# Patient Record
Sex: MALE | Race: WHITE | NOT HISPANIC OR LATINO
[De-identification: names, ages, dates, MRNs, and addresses within clinical notes are randomized per-mention and may not be internally consistent; named-entity substitution may affect disease eponyms.]

---

## 2017-01-03 ENCOUNTER — RECORD ABSTRACTING (OUTPATIENT)
Age: 57
End: 2017-01-03

## 2017-01-03 DIAGNOSIS — G47.00 INSOMNIA, UNSPECIFIED: ICD-10-CM

## 2017-01-03 DIAGNOSIS — Z87.09 PERSONAL HISTORY OF OTHER DISEASES OF THE RESPIRATORY SYSTEM: ICD-10-CM

## 2017-01-03 DIAGNOSIS — R63.4 ABNORMAL WEIGHT LOSS: ICD-10-CM

## 2017-01-10 ENCOUNTER — APPOINTMENT (OUTPATIENT)
Dept: INTERNAL MEDICINE | Facility: HOSPITAL | Age: 57
End: 2017-01-10

## 2017-01-10 ENCOUNTER — OUTPATIENT (OUTPATIENT)
Dept: OUTPATIENT SERVICES | Facility: HOSPITAL | Age: 57
LOS: 1 days | Discharge: HOME | End: 2017-01-10

## 2017-01-10 VITALS
HEART RATE: 88 BPM | DIASTOLIC BLOOD PRESSURE: 68 MMHG | BODY MASS INDEX: 22.82 KG/M2 | TEMPERATURE: 98 F | WEIGHT: 142 LBS | HEIGHT: 66 IN | SYSTOLIC BLOOD PRESSURE: 120 MMHG

## 2017-01-10 LAB
CREAT SERPL-MCNC: 0.6
GLUCOSE SERPL-MCNC: 184
HBA1C MFR BLD: 7.8

## 2017-01-10 RX ORDER — ALBUTEROL SULFATE 108 UG/1
AEROSOL, METERED RESPIRATORY (INHALATION)
Refills: 0 | Status: DISCONTINUED | COMMUNITY
End: 2017-01-10

## 2017-01-20 LAB
ALBUMIN SERPL-MCNC: 4.2 G/DL
ALBUMIN/GLOB SERPL: 1.75
ALP SERPL-CCNC: 74 IU/L
ALT SERPL-CCNC: 39 IU/L
ANION GAP SERPL CALC-SCNC: 7 MMOL/L
AST SERPL-CCNC: 22 IU/L
BASOPHILS # BLD: 0.06 TH/MM3
BASOPHILS NFR BLD: 1.2 %
BILIRUB SERPL-MCNC: 0.5 MG/DL
BUN SERPL-MCNC: 13 MG/DL
BUN/CREAT SERPL: 19.7 %
CALCIUM SERPL-MCNC: 9.4 MG/DL
CHLORIDE SERPL-SCNC: 101 MMOL/L
CHOLEST SERPL-MCNC: 136 MG/DL
CO2 SERPL-SCNC: 30 MMOL/L
CREAT SERPL-MCNC: 0.66 MG/DL
EOSINOPHIL # BLD: 0.36 TH/MM3
EOSINOPHIL NFR BLD: 7.1 %
ERYTHROCYTE [DISTWIDTH] IN BLOOD BY AUTOMATED COUNT: 12 %
GFR SERPL CREATININE-BSD FRML MDRD: 125
GLUCOSE SERPL-MCNC: 176 MG/DL
GRANULOCYTES # BLD: 2.52 TH/MM3
GRANULOCYTES NFR BLD: 49.8 %
HCT VFR BLD AUTO: 44.4 %
HDLC SERPL-MCNC: 39 MG/DL
HDLC SERPL: 3.5
HGB BLD-MCNC: 14.9 G/DL
IMM GRANULOCYTES # BLD: 0.01 TH/MM3
IMM GRANULOCYTES NFR BLD: 0.2 %
LDLC SERPL DIRECT ASSAY-MCNC: 72 MG/DL
LYMPHOCYTES # BLD: 1.42 TH/MM3
LYMPHOCYTES NFR BLD: 28.1 %
MCH RBC QN AUTO: 30 PG
MCHC RBC AUTO-ENTMCNC: 33.6 G/DL
MCV RBC AUTO: 89.3 FL
MONOCYTES # BLD: 0.69 TH/MM3
MONOCYTES NFR BLD: 13.6 %
PLATELET # BLD: 254 TH/MM3
PMV BLD AUTO: 10.6 FL
POTASSIUM SERPL-SCNC: 4.5 MMOL/L
PROT SERPL-MCNC: 6.6 G/DL
RBC # BLD AUTO: 4.97 ML/MM3
SODIUM SERPL-SCNC: 138 MMOL/L
TRIGL SERPL-MCNC: 63 MG/DL
VLDLC SERPL-MCNC: 12 MG/DL
WBC # BLD: 5.06 TH/MM3

## 2017-01-23 LAB
CREAT UR-MCNC: 111 MG/DL
ESTIMATED AVERAGE GLUCOSE (SOUTH): 180 MG/DL
HBA1C MFR BLD: 7.9 %
MICROALBUMIN UR-MCNC: 2.2 MG/DL
MICROALBUMIN/CREAT UR-RTO: 20 UG/MG

## 2017-01-24 LAB
25(OH)D3 SERPL-MCNC: 36 NG/ML
VITAMIN D2 SERPL-MCNC: <4 NG/ML
VITAMIN D3 SERPL-MCNC: 36 NG/ML

## 2017-01-26 ENCOUNTER — APPOINTMENT (OUTPATIENT)
Age: 57
End: 2017-01-26

## 2017-01-27 DIAGNOSIS — Z02.82 ENCOUNTER FOR ADOPTION SERVICES: ICD-10-CM

## 2017-02-27 PROBLEM — Z02.82 ADOPTED: Status: ACTIVE | Noted: 2017-02-27

## 2017-06-27 DIAGNOSIS — E11.9 TYPE 2 DIABETES MELLITUS WITHOUT COMPLICATIONS: ICD-10-CM

## 2017-06-27 DIAGNOSIS — Z23 ENCOUNTER FOR IMMUNIZATION: ICD-10-CM

## 2017-06-27 DIAGNOSIS — E78.00 PURE HYPERCHOLESTEROLEMIA, UNSPECIFIED: ICD-10-CM

## 2017-06-27 DIAGNOSIS — E55.9 VITAMIN D DEFICIENCY, UNSPECIFIED: ICD-10-CM

## 2018-05-01 ENCOUNTER — OUTPATIENT (OUTPATIENT)
Dept: OUTPATIENT SERVICES | Facility: HOSPITAL | Age: 58
LOS: 1 days | Discharge: HOME | End: 2018-05-01

## 2018-05-01 DIAGNOSIS — Z00.8 ENCOUNTER FOR OTHER GENERAL EXAMINATION: ICD-10-CM

## 2018-05-01 DIAGNOSIS — I49.9 CARDIAC ARRHYTHMIA, UNSPECIFIED: ICD-10-CM

## 2018-05-01 LAB
ALBUMIN SERPL ELPH-MCNC: 4.4 G/DL — SIGNIFICANT CHANGE UP (ref 3.5–5.2)
ALP SERPL-CCNC: 107 U/L — SIGNIFICANT CHANGE UP (ref 30–115)
ALT FLD-CCNC: 21 U/L — SIGNIFICANT CHANGE UP (ref 0–41)
ANION GAP SERPL CALC-SCNC: 12 MMOL/L — SIGNIFICANT CHANGE UP (ref 7–14)
APPEARANCE UR: CLEAR — SIGNIFICANT CHANGE UP
AST SERPL-CCNC: 15 U/L — SIGNIFICANT CHANGE UP (ref 0–41)
BILIRUB DIRECT SERPL-MCNC: <0.2 MG/DL — SIGNIFICANT CHANGE UP (ref 0–0.2)
BILIRUB INDIRECT FLD-MCNC: 0 MG/DL — SIGNIFICANT CHANGE UP (ref 0.2–1.2)
BILIRUB SERPL-MCNC: <0.2 MG/DL — SIGNIFICANT CHANGE UP (ref 0.2–1.2)
BILIRUB UR-MCNC: NEGATIVE — SIGNIFICANT CHANGE UP
BUN SERPL-MCNC: 12 MG/DL — SIGNIFICANT CHANGE UP (ref 10–20)
CALCIUM SERPL-MCNC: 9.9 MG/DL — SIGNIFICANT CHANGE UP (ref 8.5–10.1)
CHLORIDE SERPL-SCNC: 100 MMOL/L — SIGNIFICANT CHANGE UP (ref 98–110)
CHOLEST SERPL-MCNC: 214 MG/DL — HIGH (ref 100–200)
CO2 SERPL-SCNC: 28 MMOL/L — SIGNIFICANT CHANGE UP (ref 17–32)
COLOR SPEC: YELLOW — SIGNIFICANT CHANGE UP
CREAT SERPL-MCNC: 0.7 MG/DL — SIGNIFICANT CHANGE UP (ref 0.7–1.5)
DIFF PNL FLD: NEGATIVE — SIGNIFICANT CHANGE UP
ESTIMATED AVERAGE GLUCOSE: 160 MG/DL — HIGH (ref 68–114)
GLUCOSE SERPL-MCNC: 297 MG/DL — HIGH (ref 70–99)
GLUCOSE UR QL: >=1000 MG/DL
HBA1C BLD-MCNC: 7.2 % — HIGH (ref 4–5.6)
HCT VFR BLD CALC: 41.5 % — LOW (ref 42–52)
HDLC SERPL-MCNC: 51 MG/DL — SIGNIFICANT CHANGE UP (ref 40–125)
HGB BLD-MCNC: 13.7 G/DL — LOW (ref 14–18)
KETONES UR-MCNC: NEGATIVE — SIGNIFICANT CHANGE UP
LEUKOCYTE ESTERASE UR-ACNC: NEGATIVE — SIGNIFICANT CHANGE UP
LIPID PNL WITH DIRECT LDL SERPL: 140 MG/DL — HIGH (ref 4–129)
MAGNESIUM SERPL-MCNC: 2 MG/DL — SIGNIFICANT CHANGE UP (ref 1.8–2.4)
MCHC RBC-ENTMCNC: 30 PG — SIGNIFICANT CHANGE UP (ref 27–31)
MCHC RBC-ENTMCNC: 33 G/DL — SIGNIFICANT CHANGE UP (ref 32–37)
MCV RBC AUTO: 91 FL — SIGNIFICANT CHANGE UP (ref 80–94)
NITRITE UR-MCNC: NEGATIVE — SIGNIFICANT CHANGE UP
NRBC # BLD: 0 /100 WBCS — SIGNIFICANT CHANGE UP (ref 0–0)
PH UR: 6 — SIGNIFICANT CHANGE UP (ref 5–8)
PLATELET # BLD AUTO: 270 K/UL — SIGNIFICANT CHANGE UP (ref 130–400)
POTASSIUM SERPL-MCNC: 4.9 MMOL/L — SIGNIFICANT CHANGE UP (ref 3.5–5)
POTASSIUM SERPL-SCNC: 4.9 MMOL/L — SIGNIFICANT CHANGE UP (ref 3.5–5)
PROT SERPL-MCNC: 6.8 G/DL — SIGNIFICANT CHANGE UP (ref 6–8)
PROT UR-MCNC: NEGATIVE MG/DL — SIGNIFICANT CHANGE UP
RBC # BLD: 4.56 M/UL — LOW (ref 4.7–6.1)
RBC # FLD: 12.4 % — SIGNIFICANT CHANGE UP (ref 11.5–14.5)
SODIUM SERPL-SCNC: 140 MMOL/L — SIGNIFICANT CHANGE UP (ref 135–146)
SP GR SPEC: 1.01 — SIGNIFICANT CHANGE UP (ref 1.01–1.03)
TOTAL CHOLESTEROL/HDL RATIO MEASUREMENT: 4.2 RATIO — SIGNIFICANT CHANGE UP (ref 4–5.5)
TRIGL SERPL-MCNC: 173 MG/DL — HIGH (ref 10–149)
UROBILINOGEN FLD QL: 0.2 MG/DL — SIGNIFICANT CHANGE UP (ref 0.2–0.2)
WBC # BLD: 5.6 K/UL — SIGNIFICANT CHANGE UP (ref 4.8–10.8)
WBC # FLD AUTO: 5.6 K/UL — SIGNIFICANT CHANGE UP (ref 4.8–10.8)

## 2018-05-02 LAB
HAV IGM SER-ACNC: SIGNIFICANT CHANGE UP
HBV CORE IGM SER-ACNC: SIGNIFICANT CHANGE UP
HBV SURFACE AG SER-ACNC: SIGNIFICANT CHANGE UP
HCV AB S/CO SERPL IA: 0.21 S/CO — SIGNIFICANT CHANGE UP
HCV AB SERPL-IMP: SIGNIFICANT CHANGE UP
T PALLIDUM AB TITR SER: NEGATIVE — SIGNIFICANT CHANGE UP

## 2018-06-07 ENCOUNTER — APPOINTMENT (OUTPATIENT)
Dept: INTERNAL MEDICINE | Facility: HOSPITAL | Age: 58
End: 2018-06-07

## 2018-06-08 ENCOUNTER — EMERGENCY (EMERGENCY)
Facility: HOSPITAL | Age: 58
LOS: 0 days | Discharge: HOME | End: 2018-06-08
Attending: EMERGENCY MEDICINE | Admitting: EMERGENCY MEDICINE

## 2018-06-08 VITALS
TEMPERATURE: 99 F | DIASTOLIC BLOOD PRESSURE: 74 MMHG | HEIGHT: 68 IN | SYSTOLIC BLOOD PRESSURE: 145 MMHG | RESPIRATION RATE: 18 BRPM | WEIGHT: 149.91 LBS | HEART RATE: 72 BPM | OXYGEN SATURATION: 97 %

## 2018-06-08 DIAGNOSIS — Z79.899 OTHER LONG TERM (CURRENT) DRUG THERAPY: ICD-10-CM

## 2018-06-08 DIAGNOSIS — Z76.0 ENCOUNTER FOR ISSUE OF REPEAT PRESCRIPTION: ICD-10-CM

## 2018-06-08 DIAGNOSIS — E11.9 TYPE 2 DIABETES MELLITUS WITHOUT COMPLICATIONS: ICD-10-CM

## 2018-06-08 DIAGNOSIS — Z79.84 LONG TERM (CURRENT) USE OF ORAL HYPOGLYCEMIC DRUGS: ICD-10-CM

## 2018-06-08 DIAGNOSIS — Z79.52 LONG TERM (CURRENT) USE OF SYSTEMIC STEROIDS: ICD-10-CM

## 2018-06-08 DIAGNOSIS — Z79.82 LONG TERM (CURRENT) USE OF ASPIRIN: ICD-10-CM

## 2018-06-08 DIAGNOSIS — F17.200 NICOTINE DEPENDENCE, UNSPECIFIED, UNCOMPLICATED: ICD-10-CM

## 2018-06-08 DIAGNOSIS — Z79.891 LONG TERM (CURRENT) USE OF OPIATE ANALGESIC: ICD-10-CM

## 2018-06-08 DIAGNOSIS — F32.9 MAJOR DEPRESSIVE DISORDER, SINGLE EPISODE, UNSPECIFIED: ICD-10-CM

## 2018-06-08 DIAGNOSIS — G62.9 POLYNEUROPATHY, UNSPECIFIED: ICD-10-CM

## 2018-06-08 DIAGNOSIS — I10 ESSENTIAL (PRIMARY) HYPERTENSION: ICD-10-CM

## 2018-06-08 RX ORDER — METFORMIN HYDROCHLORIDE 850 MG/1
1 TABLET ORAL
Qty: 28 | Refills: 0 | OUTPATIENT
Start: 2018-06-08 | End: 2018-06-21

## 2018-06-08 RX ORDER — GABAPENTIN 400 MG/1
1 CAPSULE ORAL
Qty: 36 | Refills: 0 | OUTPATIENT
Start: 2018-06-08 | End: 2018-06-19

## 2018-06-08 RX ORDER — ASPIRIN/CALCIUM CARB/MAGNESIUM 324 MG
1 TABLET ORAL
Qty: 14 | Refills: 0 | OUTPATIENT
Start: 2018-06-08 | End: 2018-06-21

## 2018-06-08 RX ORDER — SERTRALINE 25 MG/1
1 TABLET, FILM COATED ORAL
Qty: 28 | Refills: 0 | OUTPATIENT
Start: 2018-06-08 | End: 2018-06-21

## 2018-06-08 RX ORDER — GABAPENTIN 400 MG/1
1 CAPSULE ORAL
Qty: 42 | Refills: 0 | OUTPATIENT
Start: 2018-06-08 | End: 2018-06-21

## 2018-06-08 RX ORDER — GABAPENTIN 400 MG/1
400 CAPSULE ORAL ONCE
Qty: 0 | Refills: 0 | Status: COMPLETED | OUTPATIENT
Start: 2018-06-08 | End: 2018-06-08

## 2018-06-08 RX ADMIN — GABAPENTIN 400 MILLIGRAM(S): 400 CAPSULE ORAL at 09:58

## 2018-06-08 NOTE — ED PROVIDER NOTE - PROGRESS NOTE DETAILS
Spoke with Dr. Lehman, will see pt this week, advised pt to call monday for earlier apt. Pt has no symptoms here. Plan for DC.

## 2018-06-08 NOTE — ED PROVIDER NOTE - NS ED ROS FT
Eyes:  No visual changes  ENMT:  No neck pain or stiffness.  Cardiac:  No chest pain, SOB   Respiratory:  No cough   GI:  No nausea, vomiting, diarrhea or abdominal pain.  :  No dysuria, frequency or burning.  MS:  No back pain.  Neuro:  No headache or weakness.  No LOC.  Skin:  No skin rash.   Endocrine: No history of thyroid disease or diabetes.

## 2018-06-08 NOTE — ED PROVIDER NOTE - OBJECTIVE STATEMENT
59 yo M recently released from [facility]. Seen by [facility] physician who wrote him for his meds, but now out of meds. Has appointment with Dr. Lehman in 2 weeks, but needs med refill till then. Pt has pill bottles with him here with dosing schedule, will plan to refill. Pt here asymptomatic.

## 2018-06-08 NOTE — ED PROVIDER NOTE - PHYSICAL EXAMINATION
CONSTITUTIONAL: Well-developed; well-nourished; in no acute distress.   SKIN: warm, dry  HEAD: Normocephalic; atraumatic.  EYES: no conj injection  ENT: No nasal discharge; airway clear.  NECK: Supple; non tender.  CARD: S1, S2 normal; no murmurs, gallops, or rubs. Regular rate and rhythm.   RESP: No wheezes, rales or rhonchi.  ABD: soft ntnd  EXT: Normal ROM.  NEURO: Alert, oriented, grossly unremarkable  PSYCH: Cooperative, appropriate.

## 2018-06-19 ENCOUNTER — OUTPATIENT (OUTPATIENT)
Dept: OUTPATIENT SERVICES | Facility: HOSPITAL | Age: 58
LOS: 1 days | Discharge: HOME | End: 2018-06-19

## 2018-06-19 ENCOUNTER — APPOINTMENT (OUTPATIENT)
Dept: INTERNAL MEDICINE | Facility: HOSPITAL | Age: 58
End: 2018-06-19

## 2018-06-19 VITALS
DIASTOLIC BLOOD PRESSURE: 78 MMHG | BODY MASS INDEX: 23.95 KG/M2 | HEART RATE: 64 BPM | TEMPERATURE: 98.7 F | HEIGHT: 66 IN | WEIGHT: 149 LBS | SYSTOLIC BLOOD PRESSURE: 132 MMHG

## 2018-06-19 DIAGNOSIS — Z87.898 PERSONAL HISTORY OF OTHER SPECIFIED CONDITIONS: ICD-10-CM

## 2018-06-19 NOTE — REVIEW OF SYSTEMS
[Depression] : depression [Fever] : no fever [Chills] : no chills [Fatigue] : no fatigue [Hot Flashes] : no hot flashes [Night Sweats] : no night sweats [Recent Change In Weight] : ~T no recent weight change [Discharge] : no discharge [Pain] : no pain [Redness] : no redness [Dryness] : no dryness [Vision Problems] : no vision problems [Itching] : no itching [Earache] : no earache [Hearing Loss] : no hearing loss [Nosebleeds] : no nosebleeds [Postnasal Drip] : no postnasal drip [Nasal Discharge] : no nasal discharge [Sore Throat] : no sore throat [Hoarseness] : no hoarseness [Chest Pain] : no chest pain [Palpitations] : no palpitations [Claudication] : no  leg claudication [Lower Ext Edema] : no lower extremity edema [Orthopena] : no orthopnea [Paroysmal Nocturnal Dyspnea] : no paroysmal nocturnal dyspnea [Shortness Of Breath] : no shortness of breath [Wheezing] : no wheezing [Cough] : no cough [Dyspnea on Exertion] : not dyspnea on exertion [Abdominal Pain] : no abdominal pain [Nausea] : no nausea [Constipation] : no constipation [Diarrhea] : no diarrhea [Vomiting] : no vomiting [Heartburn] : no heartburn [Melena] : no melena [Dysuria] : no dysuria [Incontinence] : no incontinence [Hesitancy] : no hesitancy [Nocturia] : no nocturia [Hematuria] : no hematuria [Frequency] : no frequency [Joint Pain] : no joint pain [Joint Stiffness] : no joint stiffness [Muscle Pain] : no muscle pain [Muscle Weakness] : no muscle weakness [Back Pain] : no back pain [Joint Swelling] : no joint swelling [Mole Changes] : no mole changes [Nail Changes] : no nail changes [Hair Changes] : no hair changes [Skin Rash] : no skin rash [Headache] : no headache [Dizziness] : no dizziness [Fainting] : no fainting [Confusion] : no confusion [Unsteady Walk] : no ataxia [Memory Loss] : no memory loss [Suicidal] : not suicidal [Insomnia] : no insomnia [Anxiety] : no anxiety [Easy Bleeding] : no easy bleeding [Easy Bruising] : no easy bruising [Swollen Glands] : no swollen glands

## 2018-06-19 NOTE — COUNSELING
[Smoking cessation counseling provided] : smoking cessation [de-identified] : 2000 calories low sodium, low fat/chol. high fiber ADA diet

## 2018-06-19 NOTE — HISTORY OF PRESENT ILLNESS
[FreeTextEntry1] : Pt. is a 57yo male, has not been seen since 1/17.  Pt. states he was incarcerated, and just release in 4/18.  Pt. was under treatment while incarcerated.  Pt. denies any chest pain, no shortness of breath, no palpitation, no n/v/diarrhea, no muscle/joint ache/pain/weakness, no polyuria/polydipsia/polyphagia.  Pt. has diabetic neuropathy on gabapentin.  Pt. reports his medication was change while in skilled nursing, was put on different medication and different strength.

## 2018-06-19 NOTE — ASSESSMENT
[FreeTextEntry1] : 01.  NIDDM/diabetic neuropathy: on metformin, lantus, enalapril, ASA, and gabapentin\par a.  continue current med.\par b.  2000 calories low sodium, low fat/chol. high fiber ADA diet\par c.  optometry and podiatry referral\par d.  fasting CMP, CBC, lipid profile, HgA1C, vitamin d\par e.  urine microalbumin\par f.  follow up visit in 3 months\par 02.  Hypercholesterolemia: pt. was not prescribed statin while incarcerated, will restart pravastatin\par a.  pravastatin 80mg at bedtime\par 03.  h/o Vitamin d deficiency: on vitamin d supplement\par 04.  HCM\par a.  pt. scored PHQ-9 14, pt. will be referred to mental health.  Pt. denies any suicidal/homicidal ideation\par b.  EKG ordered\par c.  GI referral for colorectal cancer screening\par d.  smoking cessation counseling done, pt. no interested to quit yet\par \par

## 2018-06-19 NOTE — HEALTH RISK ASSESSMENT
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

## 2018-06-20 DIAGNOSIS — F17.200 NICOTINE DEPENDENCE, UNSPECIFIED, UNCOMPLICATED: ICD-10-CM

## 2018-06-20 DIAGNOSIS — E11.40 TYPE 2 DIABETES MELLITUS WITH DIABETIC NEUROPATHY, UNSPECIFIED: ICD-10-CM

## 2018-06-20 DIAGNOSIS — Z86.39 PERSONAL HISTORY OF OTHER ENDOCRINE, NUTRITIONAL AND METABOLIC DISEASE: ICD-10-CM

## 2018-06-20 DIAGNOSIS — E78.00 PURE HYPERCHOLESTEROLEMIA, UNSPECIFIED: ICD-10-CM

## 2018-06-20 DIAGNOSIS — E11.29 TYPE 2 DIABETES MELLITUS WITH OTHER DIABETIC KIDNEY COMPLICATION: ICD-10-CM

## 2018-06-20 DIAGNOSIS — R80.9 PROTEINURIA, UNSPECIFIED: ICD-10-CM

## 2018-06-20 LAB
ESTIMATED AVERAGE GLUCOSE: 177 MG/DL
HBA1C MFR BLD HPLC: 7.8 %

## 2018-06-21 LAB
ALBUMIN SERPL ELPH-MCNC: 4.7 G/DL
ALP BLD-CCNC: 95 U/L
ALT SERPL-CCNC: 33 U/L
ANION GAP SERPL CALC-SCNC: 15 MMOL/L
AST SERPL-CCNC: 22 U/L
BASOPHILS # BLD AUTO: 0.09 K/UL
BASOPHILS NFR BLD AUTO: 1.6 %
BILIRUB SERPL-MCNC: <0.2 MG/DL
BUN SERPL-MCNC: 13 MG/DL
CALCIUM SERPL-MCNC: 9.6 MG/DL
CHLORIDE SERPL-SCNC: 97 MMOL/L
CHOLEST SERPL-MCNC: 165 MG/DL
CHOLEST/HDLC SERPL: 3.8 RATIO
CO2 SERPL-SCNC: 28 MMOL/L
CREAT SERPL-MCNC: 0.8 MG/DL
CREAT SPEC-SCNC: 131 MG/DL
EOSINOPHIL # BLD AUTO: 0.29 K/UL
EOSINOPHIL NFR BLD AUTO: 5.3 %
GLUCOSE SERPL-MCNC: 224 MG/DL
HCT VFR BLD CALC: 43.5 %
HDLC SERPL-MCNC: 43 MG/DL
HGB BLD-MCNC: 14.4 G/DL
IMM GRANULOCYTES NFR BLD AUTO: 0.2 %
LDLC SERPL CALC-MCNC: 72 MG/DL
LYMPHOCYTES # BLD AUTO: 1.42 K/UL
LYMPHOCYTES NFR BLD AUTO: 26 %
MAN DIFF?: NORMAL
MCHC RBC-ENTMCNC: 29.4 PG
MCHC RBC-ENTMCNC: 33.1 G/DL
MCV RBC AUTO: 89 FL
MICROALBUMIN 24H UR DL<=1MG/L-MCNC: 1.4 MG/DL
MICROALBUMIN/CREAT 24H UR-RTO: 11 MG/G
MONOCYTES # BLD AUTO: 0.76 K/UL
MONOCYTES NFR BLD AUTO: 13.9 %
NEUTROPHILS # BLD AUTO: 2.89 K/UL
NEUTROPHILS NFR BLD AUTO: 53 %
PLATELET # BLD AUTO: 239 K/UL
POTASSIUM SERPL-SCNC: 5 MMOL/L
PROT SERPL-MCNC: 7.1 G/DL
RBC # BLD: 4.89 M/UL
RBC # FLD: 12.1 %
SODIUM SERPL-SCNC: 140 MMOL/L
TRIGL SERPL-MCNC: 252 MG/DL
WBC # FLD AUTO: 5.46 K/UL

## 2018-06-28 LAB — 25(OH)D3 SERPL-MCNC: 27 NG/ML

## 2018-07-03 ENCOUNTER — APPOINTMENT (OUTPATIENT)
Dept: PODIATRY | Facility: HOSPITAL | Age: 58
End: 2018-07-03

## 2018-07-23 PROBLEM — G62.9 POLYNEUROPATHY, UNSPECIFIED: Chronic | Status: ACTIVE | Noted: 2018-06-08

## 2018-07-23 PROBLEM — E11.9 TYPE 2 DIABETES MELLITUS WITHOUT COMPLICATIONS: Chronic | Status: ACTIVE | Noted: 2018-06-08

## 2018-07-23 PROBLEM — I10 ESSENTIAL (PRIMARY) HYPERTENSION: Chronic | Status: ACTIVE | Noted: 2018-06-08

## 2018-07-23 PROBLEM — F32.9 MAJOR DEPRESSIVE DISORDER, SINGLE EPISODE, UNSPECIFIED: Chronic | Status: ACTIVE | Noted: 2018-06-08

## 2018-07-25 ENCOUNTER — APPOINTMENT (OUTPATIENT)
Dept: INTERNAL MEDICINE | Facility: HOSPITAL | Age: 58
End: 2018-07-25

## 2018-07-31 ENCOUNTER — APPOINTMENT (OUTPATIENT)
Dept: INTERNAL MEDICINE | Facility: HOSPITAL | Age: 58
End: 2018-07-31

## 2018-08-06 ENCOUNTER — RX RENEWAL (OUTPATIENT)
Age: 58
End: 2018-08-06

## 2018-08-06 RX ORDER — INSULIN GLARGINE 100 [IU]/ML
100 INJECTION, SOLUTION SUBCUTANEOUS
Qty: 1 | Refills: 2 | Status: DISCONTINUED | COMMUNITY
End: 2018-08-06

## 2018-08-14 ENCOUNTER — APPOINTMENT (OUTPATIENT)
Dept: PODIATRY | Facility: HOSPITAL | Age: 58
End: 2018-08-14

## 2018-09-04 ENCOUNTER — APPOINTMENT (OUTPATIENT)
Dept: PODIATRY | Facility: HOSPITAL | Age: 58
End: 2018-09-04

## 2018-09-13 ENCOUNTER — APPOINTMENT (OUTPATIENT)
Dept: PODIATRY | Facility: HOSPITAL | Age: 58
End: 2018-09-13

## 2018-09-19 ENCOUNTER — APPOINTMENT (OUTPATIENT)
Dept: INTERNAL MEDICINE | Facility: HOSPITAL | Age: 58
End: 2018-09-19

## 2018-10-01 ENCOUNTER — RX RENEWAL (OUTPATIENT)
Age: 58
End: 2018-10-01

## 2018-10-22 ENCOUNTER — RX RENEWAL (OUTPATIENT)
Age: 58
End: 2018-10-22

## 2018-11-08 ENCOUNTER — OUTPATIENT (OUTPATIENT)
Dept: OUTPATIENT SERVICES | Facility: HOSPITAL | Age: 58
LOS: 1 days | Discharge: HOME | End: 2018-11-08

## 2018-11-08 ENCOUNTER — APPOINTMENT (OUTPATIENT)
Dept: INTERNAL MEDICINE | Facility: HOSPITAL | Age: 58
End: 2018-11-08

## 2018-11-08 VITALS
HEART RATE: 68 BPM | DIASTOLIC BLOOD PRESSURE: 78 MMHG | HEIGHT: 66.25 IN | TEMPERATURE: 98.1 F | WEIGHT: 155 LBS | BODY MASS INDEX: 24.91 KG/M2 | SYSTOLIC BLOOD PRESSURE: 130 MMHG

## 2018-11-08 RX ORDER — GABAPENTIN 400 MG/1
400 CAPSULE ORAL
Qty: 90 | Refills: 2 | Status: DISCONTINUED | COMMUNITY
End: 2018-11-08

## 2018-11-08 NOTE — ASSESSMENT
[FreeTextEntry1] : 01.  Diabetic with peripheral neuropathy: on basaglar, metformin, neurontin, enalapril, and ASA, HgA1C 7.8\par a.  increase basaglar to 20unit SQ at bedtime\par b.  continue metformin, enalapril, ASA\par c.  follow optometry and podiatry as scheduled\par d.  follow up visit in 3 months\par e.  fasting CMP, CBC, lipid profile, HgA1C\par f.  urine microalbumin\par g.  increase neurontin to 600mg twice daily\par 02.  Hypercholesterolemia: LDL 72, triglyceride 252 on pravastatin\par a.  continue pravastatin\par 03.  HCM\par a.  flu vaccine ordered\par b.  follow GI as scheduled\par c.  follow mental health

## 2018-11-08 NOTE — COUNSELING
[Weight management counseling provided] : Weight management [Healthy eating counseling provided] : healthy eating [Activity counseling provided] : activity [Low Fat Diet] : Low fat diet [Decrease Portions] : Decrease food portions [Low Salt Diet] : Low salt diet [Walking] : Walking [de-identified] : 2000 calories low sodium, low fat/chol. high fiber ADA diet

## 2018-11-08 NOTE — HISTORY OF PRESENT ILLNESS
[FreeTextEntry1] : Pt. is a 59yo male, here for follow up and medication renewal.  Pt. states his peripheral neuropathy worsen, not controlled by neurontin 400mg three times daily.  Pt. reports that he is compliant with medication and diet, no polyuria/polydipsia/polyphagia, no shortness of breath, no n/v/diarrhea

## 2018-11-12 DIAGNOSIS — E11.40 TYPE 2 DIABETES MELLITUS WITH DIABETIC NEUROPATHY, UNSPECIFIED: ICD-10-CM

## 2018-11-12 DIAGNOSIS — Z23 ENCOUNTER FOR IMMUNIZATION: ICD-10-CM

## 2018-11-12 DIAGNOSIS — R80.9 PROTEINURIA, UNSPECIFIED: ICD-10-CM

## 2018-11-12 DIAGNOSIS — E78.00 PURE HYPERCHOLESTEROLEMIA, UNSPECIFIED: ICD-10-CM

## 2018-11-12 DIAGNOSIS — E11.29 TYPE 2 DIABETES MELLITUS WITH OTHER DIABETIC KIDNEY COMPLICATION: ICD-10-CM

## 2018-11-13 ENCOUNTER — RX RENEWAL (OUTPATIENT)
Age: 58
End: 2018-11-13

## 2018-11-13 ENCOUNTER — OUTPATIENT (OUTPATIENT)
Dept: OUTPATIENT SERVICES | Facility: HOSPITAL | Age: 58
LOS: 1 days | Discharge: HOME | End: 2018-11-13
Payer: MEDICAID

## 2018-11-13 ENCOUNTER — APPOINTMENT (OUTPATIENT)
Dept: PODIATRY | Facility: HOSPITAL | Age: 58
End: 2018-11-13

## 2018-11-13 PROCEDURE — 99213 OFFICE O/P EST LOW 20 MIN: CPT

## 2018-11-14 DIAGNOSIS — E11.40 TYPE 2 DIABETES MELLITUS WITH DIABETIC NEUROPATHY, UNSPECIFIED: ICD-10-CM

## 2018-12-10 ENCOUNTER — RX RENEWAL (OUTPATIENT)
Age: 58
End: 2018-12-10

## 2018-12-17 ENCOUNTER — RX RENEWAL (OUTPATIENT)
Age: 58
End: 2018-12-17

## 2018-12-29 ENCOUNTER — INPATIENT (INPATIENT)
Facility: HOSPITAL | Age: 58
LOS: 2 days | Discharge: HOME | End: 2019-01-01
Attending: INTERNAL MEDICINE | Admitting: INTERNAL MEDICINE

## 2018-12-29 VITALS
RESPIRATION RATE: 20 BRPM | OXYGEN SATURATION: 100 % | SYSTOLIC BLOOD PRESSURE: 190 MMHG | HEART RATE: 69 BPM | TEMPERATURE: 96 F | DIASTOLIC BLOOD PRESSURE: 88 MMHG

## 2018-12-29 LAB
ALBUMIN SERPL ELPH-MCNC: 4.4 G/DL — SIGNIFICANT CHANGE UP (ref 3.5–5.2)
ALP SERPL-CCNC: 110 U/L — SIGNIFICANT CHANGE UP (ref 30–115)
ALT FLD-CCNC: 24 U/L — SIGNIFICANT CHANGE UP (ref 0–41)
ANION GAP SERPL CALC-SCNC: 19 MMOL/L — HIGH (ref 7–14)
APTT BLD: 33.6 SEC — SIGNIFICANT CHANGE UP (ref 27–39.2)
AST SERPL-CCNC: 19 U/L — SIGNIFICANT CHANGE UP (ref 0–41)
BASE EXCESS BLDV CALC-SCNC: 5.7 MMOL/L — HIGH (ref -2–2)
BASOPHILS # BLD AUTO: 0.1 K/UL — SIGNIFICANT CHANGE UP (ref 0–0.2)
BASOPHILS NFR BLD AUTO: 1.2 % — HIGH (ref 0–1)
BILIRUB SERPL-MCNC: <0.2 MG/DL — SIGNIFICANT CHANGE UP (ref 0.2–1.2)
BLD GP AB SCN SERPL QL: SIGNIFICANT CHANGE UP
BUN SERPL-MCNC: 11 MG/DL — SIGNIFICANT CHANGE UP (ref 10–20)
CA-I SERPL-SCNC: 1.14 MMOL/L — SIGNIFICANT CHANGE UP (ref 1.12–1.3)
CALCIUM SERPL-MCNC: 9.9 MG/DL — SIGNIFICANT CHANGE UP (ref 8.5–10.1)
CHLORIDE SERPL-SCNC: 95 MMOL/L — LOW (ref 98–110)
CO2 SERPL-SCNC: 24 MMOL/L — SIGNIFICANT CHANGE UP (ref 17–32)
CREAT SERPL-MCNC: 0.8 MG/DL — SIGNIFICANT CHANGE UP (ref 0.7–1.5)
EOSINOPHIL # BLD AUTO: 0.43 K/UL — SIGNIFICANT CHANGE UP (ref 0–0.7)
EOSINOPHIL NFR BLD AUTO: 5.2 % — SIGNIFICANT CHANGE UP (ref 0–8)
GAS PNL BLDV: 137 MMOL/L — SIGNIFICANT CHANGE UP (ref 136–145)
GAS PNL BLDV: SIGNIFICANT CHANGE UP
GLUCOSE SERPL-MCNC: 312 MG/DL — HIGH (ref 70–99)
HCO3 BLDV-SCNC: 28 MMOL/L — SIGNIFICANT CHANGE UP (ref 22–29)
HCT VFR BLD CALC: 45.1 % — SIGNIFICANT CHANGE UP (ref 42–52)
HCT VFR BLDA CALC: 51.2 % — HIGH (ref 34–44)
HGB BLD CALC-MCNC: 16.7 G/DL — SIGNIFICANT CHANGE UP (ref 14–18)
HGB BLD-MCNC: 15.7 G/DL — SIGNIFICANT CHANGE UP (ref 14–18)
IMM GRANULOCYTES NFR BLD AUTO: 0.4 % — HIGH (ref 0.1–0.3)
INR BLD: 0.9 RATIO — SIGNIFICANT CHANGE UP (ref 0.65–1.3)
LACTATE BLDV-MCNC: 3.3 MMOL/L — HIGH (ref 0.5–1.6)
LYMPHOCYTES # BLD AUTO: 1.61 K/UL — SIGNIFICANT CHANGE UP (ref 1.2–3.4)
LYMPHOCYTES # BLD AUTO: 19.4 % — LOW (ref 20.5–51.1)
MCHC RBC-ENTMCNC: 29.7 PG — SIGNIFICANT CHANGE UP (ref 27–31)
MCHC RBC-ENTMCNC: 34.8 G/DL — SIGNIFICANT CHANGE UP (ref 32–37)
MCV RBC AUTO: 85.3 FL — SIGNIFICANT CHANGE UP (ref 80–94)
MONOCYTES # BLD AUTO: 1.01 K/UL — HIGH (ref 0.1–0.6)
MONOCYTES NFR BLD AUTO: 12.2 % — HIGH (ref 1.7–9.3)
NEUTROPHILS # BLD AUTO: 5.13 K/UL — SIGNIFICANT CHANGE UP (ref 1.4–6.5)
NEUTROPHILS NFR BLD AUTO: 61.6 % — SIGNIFICANT CHANGE UP (ref 42.2–75.2)
NRBC # BLD: 0 /100 WBCS — SIGNIFICANT CHANGE UP (ref 0–0)
PCO2 BLDV: 33 MMHG — LOW (ref 41–51)
PH BLDV: 7.53 — HIGH (ref 7.26–7.43)
PLATELET # BLD AUTO: 252 K/UL — SIGNIFICANT CHANGE UP (ref 130–400)
PO2 BLDV: 21 MMHG — SIGNIFICANT CHANGE UP (ref 20–40)
POTASSIUM BLDV-SCNC: 3.6 MMOL/L — SIGNIFICANT CHANGE UP (ref 3.3–5.6)
POTASSIUM SERPL-MCNC: 4.1 MMOL/L — SIGNIFICANT CHANGE UP (ref 3.5–5)
POTASSIUM SERPL-SCNC: 4.1 MMOL/L — SIGNIFICANT CHANGE UP (ref 3.5–5)
PROT SERPL-MCNC: 7.1 G/DL — SIGNIFICANT CHANGE UP (ref 6–8)
PROTHROM AB SERPL-ACNC: 10.4 SEC — SIGNIFICANT CHANGE UP (ref 9.95–12.87)
RBC # BLD: 5.29 M/UL — SIGNIFICANT CHANGE UP (ref 4.7–6.1)
RBC # FLD: 12.4 % — SIGNIFICANT CHANGE UP (ref 11.5–14.5)
SAO2 % BLDV: 54 % — SIGNIFICANT CHANGE UP
SODIUM SERPL-SCNC: 138 MMOL/L — SIGNIFICANT CHANGE UP (ref 135–146)
TROPONIN T SERPL-MCNC: <0.01 NG/ML — SIGNIFICANT CHANGE UP
TYPE + AB SCN PNL BLD: SIGNIFICANT CHANGE UP
WBC # BLD: 8.31 K/UL — SIGNIFICANT CHANGE UP (ref 4.8–10.8)
WBC # FLD AUTO: 8.31 K/UL — SIGNIFICANT CHANGE UP (ref 4.8–10.8)

## 2018-12-29 RX ORDER — MORPHINE SULFATE 50 MG/1
2 CAPSULE, EXTENDED RELEASE ORAL ONCE
Qty: 0 | Refills: 0 | Status: DISCONTINUED | OUTPATIENT
Start: 2018-12-29 | End: 2018-12-29

## 2018-12-29 RX ORDER — INSULIN GLARGINE 100 [IU]/ML
14 INJECTION, SOLUTION SUBCUTANEOUS AT BEDTIME
Qty: 0 | Refills: 0 | Status: DISCONTINUED | OUTPATIENT
Start: 2018-12-29 | End: 2019-01-01

## 2018-12-29 RX ORDER — TICAGRELOR 90 MG/1
180 TABLET ORAL ONCE
Qty: 0 | Refills: 0 | Status: COMPLETED | OUTPATIENT
Start: 2018-12-29 | End: 2018-12-29

## 2018-12-29 RX ORDER — ATORVASTATIN CALCIUM 80 MG/1
80 TABLET, FILM COATED ORAL AT BEDTIME
Qty: 0 | Refills: 0 | Status: DISCONTINUED | OUTPATIENT
Start: 2018-12-29 | End: 2019-01-01

## 2018-12-29 RX ORDER — ASPIRIN/CALCIUM CARB/MAGNESIUM 324 MG
81 TABLET ORAL DAILY
Qty: 0 | Refills: 0 | Status: DISCONTINUED | OUTPATIENT
Start: 2018-12-29 | End: 2019-01-01

## 2018-12-29 RX ORDER — SODIUM CHLORIDE 9 MG/ML
1000 INJECTION INTRAMUSCULAR; INTRAVENOUS; SUBCUTANEOUS
Qty: 0 | Refills: 0 | Status: DISCONTINUED | OUTPATIENT
Start: 2018-12-29 | End: 2019-01-01

## 2018-12-29 RX ORDER — TICAGRELOR 90 MG/1
90 TABLET ORAL
Qty: 0 | Refills: 0 | Status: DISCONTINUED | OUTPATIENT
Start: 2018-12-29 | End: 2019-01-01

## 2018-12-29 RX ORDER — INSULIN LISPRO 100/ML
5 VIAL (ML) SUBCUTANEOUS
Qty: 0 | Refills: 0 | Status: DISCONTINUED | OUTPATIENT
Start: 2018-12-29 | End: 2019-01-01

## 2018-12-29 RX ORDER — ASPIRIN/CALCIUM CARB/MAGNESIUM 324 MG
325 TABLET ORAL ONCE
Qty: 0 | Refills: 0 | Status: COMPLETED | OUTPATIENT
Start: 2018-12-29 | End: 2018-12-29

## 2018-12-29 RX ORDER — METOPROLOL TARTRATE 50 MG
25 TABLET ORAL
Qty: 0 | Refills: 0 | Status: DISCONTINUED | OUTPATIENT
Start: 2018-12-29 | End: 2019-01-01

## 2018-12-29 RX ADMIN — TICAGRELOR 90 MILLIGRAM(S): 90 TABLET ORAL at 17:41

## 2018-12-29 RX ADMIN — MORPHINE SULFATE 2 MILLIGRAM(S): 50 CAPSULE, EXTENDED RELEASE ORAL at 19:30

## 2018-12-29 RX ADMIN — Medication 5 UNIT(S): at 17:11

## 2018-12-29 RX ADMIN — Medication 81 MILLIGRAM(S): at 17:41

## 2018-12-29 RX ADMIN — TICAGRELOR 180 MILLIGRAM(S): 90 TABLET ORAL at 10:56

## 2018-12-29 RX ADMIN — Medication 25 MILLIGRAM(S): at 17:41

## 2018-12-29 RX ADMIN — MORPHINE SULFATE 2 MILLIGRAM(S): 50 CAPSULE, EXTENDED RELEASE ORAL at 19:11

## 2018-12-29 RX ADMIN — Medication 325 MILLIGRAM(S): at 10:46

## 2018-12-29 RX ADMIN — INSULIN GLARGINE 14 UNIT(S): 100 INJECTION, SOLUTION SUBCUTANEOUS at 23:12

## 2018-12-29 RX ADMIN — SODIUM CHLORIDE 75 MILLILITER(S): 9 INJECTION INTRAMUSCULAR; INTRAVENOUS; SUBCUTANEOUS at 13:39

## 2018-12-29 NOTE — ED PROVIDER NOTE - NS ED ROS FT
General: No fevers, chills, +nausea, +vomiting  Eyes:  No visual changes, eye pain or discharge.  ENMT:  No hearing changes,   Cardiac:  +chest pain,   Respiratory:  No cough or respiratory distress.  GI:  +nausea, +vomiting,   :  No dysuria  MS:  No back pain.  Neuro:   No LOC.  Skin:  No skin rash.   Endocrine:+ diabetes.

## 2018-12-29 NOTE — H&P ADULT - ATTENDING COMMENTS
59 yo M with h/o DM, Anxiety, depression presents to ED with sudden onset of left sided chest pain which started 2 hrs ago, The Chest Pain is associated with nausea, pain still persisted while in the ER, EKG revealed ST elevation in inferior  leads labs showed negative troponin, pt was rushed to cath lab for PCI and stent placed in RCA.     PHYSICAL EXAM:  GENERAL: NAD, well-developed  HEAD:  Atraumatic, Normocephalic  EYES: EOMI, PERRLA, conjunctiva and sclera clear  NECK: Supple, No JVD  CHEST/LUNG: Clear to auscultation bilaterally; No wheeze  HEART: Regular rate and rhythm; S1 S2  ABDOMEN: Soft, Nontender, Nondistended; Bowel sounds present  EXTREMITIES:  2+ Peripheral Pulses, No clubbing, cyanosis, or edema  PSYCH: AAOx3  NEUROLOGY: non-focal  SKIN: No rashes or lesions    A/P:   1. Acute Inferior ST elevation Myocardial infarction:   s/p PCI and RCA stenting.   Continue ASA, Brilinta  Continue Lopressor and Lipitor  Check Lipi panel and A1C  Echocardiogram, cardiology follow up    DM type 2:   On Lantus and Pre-meal Humalog for now.     Chronic Pain: on left foot,   he takes Methadone 70g daily, will confirm the dose  He denies any Prior Narcotic, he started on Methadone while in retirement.

## 2018-12-29 NOTE — H&P ADULT - NSHPLABSRESULTS_GEN_ALL_CORE
15.7   8.31  )-----------( 252      ( 29 Dec 2018 10:40 )             45.1     12-29    138  |  95<L>  |  11  ----------------------------<  312<H>  4.1   |  24  |  0.8    Ca    9.9      29 Dec 2018 10:40    TPro  7.1  /  Alb  4.4  /  TBili  <0.2  /  DBili  x   /  AST  19  /  ALT  24  /  AlkPhos  110  12-29    CARDIAC MARKERS ( 29 Dec 2018 10:40 )  x     / <0.01 ng/mL / x     / x     / x          PT/INR - ( 29 Dec 2018 10:40 )   PT: 10.40 sec;   INR: 0.90 ratio         PTT - ( 29 Dec 2018 10:40 )  PTT:33.6 sec

## 2018-12-29 NOTE — PROGRESS NOTE ADULT - SUBJECTIVE AND OBJECTIVE BOX
Patient aware of risk and benefits of planned procedure up to and including death and agrees with planned procedure.12-29-18 @ 14:04        POST OPERATIVE PROCEDURAL DOCUMENTATION  PRE-OP DIAGNOSIS:  STEMI    PROCEDURE:   LEFT HEART CATHERIZATION    Physician:MILLA Hemphill MD  Assistant:  MD    ANESTHESIA TYPE:  [ X] Sedation  [ X] Local/Regional  [   ]General Anesthesia    ESTIMATED BLOOD LOSS:      less than 10 mL    CONDITION  [X ] Good  [   ] Fair  [   ] Serious  [   ] Critical      SPECIMENS REMOVED (IF APPLICABLE):   None          IMPLANTS (IF APPLICABLE)      FINDINGS:  LEFT HEART CATHERIZATION                                    LVEF%:  LVEDP:    Left main:nl    LAD:   60%               Diag:      Left Circumflex: moderate  OM:      Right Cornary Artery:  99%  RPDA:    RPL:        RIGHT HEART CATHERIZATION  PA:  PCW:  CO/CI:    PERCUTANEOUS CORONARY INTERVENTIONS:  stent rca    COMPLICATIONS:  None      POST-OP DIAGNOSIS  successful emergent pci of the rca        PLAN OF CARE  aggressive risk factor modification and medical therapy.

## 2018-12-29 NOTE — ED ADULT NURSE NOTE - NSIMPLEMENTINTERV_GEN_ALL_ED
Implemented All Universal Safety Interventions:  [place] to call system. Call bell, personal items and telephone within reach. Instruct patient to call for assistance. Room bathroom lighting operational. Non-slip footwear when patient is off stretcher. Physically safe environment: no spills, clutter or unnecessary equipment. Stretcher in lowest position, wheels locked, appropriate side rails in place.

## 2018-12-29 NOTE — ED PROVIDER NOTE - ATTENDING CONTRIBUTION TO CARE
58y m h/o dm p/w mid-sternal CP x 12h, accomp by nv. EKG on arrival showing inf CARLITO, STEMI code activated, Cardio fellow Dr. Bah assessed pt, d/w Dr. Blair, asa/brillinta given and taken to cath lab. PMD Dr. Lehman. PE: middle-aged m appears in pain holding chest, ncat, neck supple no jvd, rrr nl s1s2 no mrg, ctab no wrr, abd soft ntnd no palpable masses no rgr, no cvat, ext no cce dpi.

## 2018-12-29 NOTE — CONSULT NOTE ADULT - ASSESSMENT
59 yo M     Acute IWMI   DM/Smoker    CCU monitoring  c/w ASA 81mg, Brilinta 90 mg q12h  Lopressor 25 mg BID, Lipitor 80 mh qhs  2D echo  Angiomax drip for 1 hour  Integrilin drip for 2 hours  Groin sheath in place  Smoking cesssation

## 2018-12-29 NOTE — H&P ADULT - HISTORY OF PRESENT ILLNESS
57 yo M with h/o DM, Anxiety, depression presents to ED with sudden onset of chest pain which started 2 hr PTA. The Chest Pain is associated with nausea, pain still persisted while in the ER, EKG revealed ST elevation in inferior  leads pt was rushed to cath lab for emergent cath. Patient received stent to the RCA.

## 2018-12-29 NOTE — H&P ADULT - ASSESSMENT
57 yo M with h/o DM, Anxiety, depression presents to ED with sudden onset of chest pain which started 2 hr PTA. The Chest Pain is associated with nausea, pain still persisted while in the ER, EKG revealed ST elevation in inferior  leads pt was rushed to cath lab for emergent cath. Patient received stent to the RCA.     1) STEMI   Emergent Cath performed.   Patient received stent to RCA   c/w ASA 81mg  Brilinta 90 mg q12h  Lopressor 25 mg BID, Lipitor 80 mh qhs  2D echo to follow   Angiomax drip for 1 hour  Integrilin drip for 2 hours  Follow with Cardiology for further recommendations in AM.     2) DM  Blood glucose range less than 180  Continue fingersticks   Insulin regimen if blood glucose >200     3) Disposition   CCU Monitoring for now

## 2018-12-29 NOTE — CONSULT NOTE ADULT - SUBJECTIVE AND OBJECTIVE BOX
Chief complaint:  Chest Pain    HPI:  57 yo M h/o DM, Anxiety, depression here with sudden inset of chest pain started 2 hr PTA, associated with nausea, pain still persisted by the time in ER, EKG revealed ST elevation in inf leads pt was rushed to cath lab for emergent cath    ROS:  Constitutional: No fever, chill, sweats  Eye: No recent visual problem  ENMT: No ear pain, nasal congestion, throat pain  Respiratoty: No SOB, cough  Cardiovascular: + chest pain, palpitaion, syncope  Gastrointestinal: + nausea, vomitting, diarhea  Genitourinary: No dysuria, hematuria  Heam/Lymp: No brusing tendency, no swollen glands  Endocrine: Negative for excessive hunger, thirst  Musculoskeletal: No neck pain, back pain, joint pain  Intergumentory: No rash, skin lesions  Neurologic: alert and oriented    PAST MEDICAL & SURGICAL HISTORY  Depression  Neuropathy  Hypertension  Diabetes      FAMILY HISTORY:  FAMILY HISTORY:  NC    SOCIAL HISTORY:  + smoker    ALLERGIES:  No Known Allergies      MEDICATIONS:  MEDICATIONS  (STANDING):  aspirin enteric coated 81 milliGRAM(s) Oral daily  atorvastatin 80 milliGRAM(s) Oral at bedtime  metoprolol tartrate 25 milliGRAM(s) Oral two times a day  sodium chloride 0.9%. 1000 milliLiter(s) (75 mL/Hr) IV Continuous <Continuous>  ticagrelor 90 milliGRAM(s) Oral two times a day    MEDICATIONS  (PRN):      HOME MEDICATIONS:  Home Medications:  aspirin 81 mg oral tablet, chewable: 1 tab(s) orally once a day (08 Jun 2018 09:28)  enalapril 2.5 mg oral tablet: 1 tab(s) orally once a day (08 Jun 2018 09:28)  gabapentin 300 mg oral capsule: 1 cap(s) orally 3 times a day (08 Jun 2018 10:15)  metFORMIN 850 mg oral tablet:  (08 Jun 2018 09:28)  sertraline:  (08 Jun 2018 09:28)      VITALS:   T(F): 96.5 (12-29 @ 10:38), Max: 96.5 (12-29 @ 10:38)  HR: 69 (12-29 @ 10:38) (69 - 69)  BP: 190/88 (12-29 @ 10:38) (190/88 - 190/88)  BP(mean): --  RR: 20 (12-29 @ 10:38) (20 - 20)  SpO2: 100% (12-29 @ 10:38) (100% - 100%)    I&O's Summary      PHYSICAL EXAM:  GEN: Alert and oriented X 3, Well nourished, No acute distress  NECK: Supple, non tender, NO JVD, No carotid bruit,   LUNGS: Clear to auscultation bilaterally, non labored respiration  CARDIOVASCULAR: S1/S2 present, RRR , no murmus or rubs  ABD: Soft, non-tender, non-distended,   EXT: No Lower extreimity edema, no tenderness  NEURO: Non focal  SKIN: Intact    LABS:                        15.7   8.31  )-----------( 252      ( 29 Dec 2018 10:40 )             45.1     12-29    138  |  95<L>  |  11  ----------------------------<  312<H>  4.1   |  24  |  0.8    Ca    9.9      29 Dec 2018 10:40    TPro  7.1  /  Alb  4.4  /  TBili  <0.2  /  DBili  x   /  AST  19  /  ALT  24  /  AlkPhos  110  12-29    PT/INR - ( 29 Dec 2018 10:40 )   PT: 10.40 sec;   INR: 0.90 ratio         PTT - ( 29 Dec 2018 10:40 )  PTT:33.6 sec  Troponin T, Serum: <0.01 ng/mL (12-29-18 @ 10:40)    CARDIAC MARKERS ( 29 Dec 2018 10:40 )  x     / <0.01 ng/mL / x     / x     / x          ECG:  NSR, ST elevation inf leads, reciprocal changes    TELEMETRY EVENTS:  NSR

## 2018-12-29 NOTE — ED PROVIDER NOTE - PHYSICAL EXAMINATION
CONSTITUTIONAL: Well-developed; well-nourished; clutching chest  SKIN: warm, dry  HEAD: Normocephalic; atraumatic  EYES: PERRL, EOMI, no conjunctival erythema  ENT: No nasal discharge; airway clear, mucous membranes moist  NECK: Supple; non tender, FROM  CARD: +S1, S2 no murmurs, gallops, or rubs. Regular rate and rhythm. radial 2+  RESP: No wheezes, rales or rhonchi. CTABL  abd: soft, non tender  EXT: moves all extremities, No clubbing, cyanosis or edema. NEURO: Alert, oriented, grossly unremarkable, no focal deficits, cn ii-xii grossly intact  PSYCH: Cooperative, appropriate

## 2018-12-29 NOTE — ED PROVIDER NOTE - OBJECTIVE STATEMENT
57yo m pmhx dm presents 12 hours midsternal chest pain associated w nausea and vomiting. takes 81mg asa daily, no prior cardiac history. no fevers. STEMI code called, dr cage at bedside

## 2018-12-29 NOTE — H&P ADULT - NSHPPHYSICALEXAM_GEN_ALL_CORE
PHYSICAL EXAM:  GENERAL: NAD, speaks in full sentences, no signs of respiratory distress  HEAD:  Atraumatic, Normocephalic  EYES: EOMI, PERRLA, conjunctiva and sclera clear  NECK: Supple, No JVD  CHEST/LUNG: Clear to auscultation bilaterally; No wheeze; No crackles; No accessory muscles used  HEART: s1 s2 no murmur   ABDOMEN: Soft, Nontender, Nondistended; Bowel sounds present; No guarding  EXTREMITIES:  2+ Peripheral Pulses, No cyanosis or edema  PSYCH: AAOx3  NEUROLOGY: non-focal  SKIN: No rashes or lesions

## 2018-12-29 NOTE — ED PROVIDER NOTE - CRITICAL CARE PROVIDED
direct patient care (not related to procedure)/additional history taking/documentation/consultation with other physicians/conducted a detailed discussion of DNR status/interpretation of diagnostic studies

## 2018-12-30 LAB
ALBUMIN SERPL ELPH-MCNC: 4.2 G/DL — SIGNIFICANT CHANGE UP (ref 3.5–5.2)
ALP SERPL-CCNC: 81 U/L — SIGNIFICANT CHANGE UP (ref 30–115)
ALT FLD-CCNC: 38 U/L — SIGNIFICANT CHANGE UP (ref 0–41)
ANION GAP SERPL CALC-SCNC: 14 MMOL/L — SIGNIFICANT CHANGE UP (ref 7–14)
AST SERPL-CCNC: 68 U/L — HIGH (ref 0–41)
BILIRUB SERPL-MCNC: 0.2 MG/DL — SIGNIFICANT CHANGE UP (ref 0.2–1.2)
BUN SERPL-MCNC: 17 MG/DL — SIGNIFICANT CHANGE UP (ref 10–20)
CALCIUM SERPL-MCNC: 9.4 MG/DL — SIGNIFICANT CHANGE UP (ref 8.5–10.1)
CHLORIDE SERPL-SCNC: 99 MMOL/L — SIGNIFICANT CHANGE UP (ref 98–110)
CO2 SERPL-SCNC: 27 MMOL/L — SIGNIFICANT CHANGE UP (ref 17–32)
CREAT SERPL-MCNC: 0.9 MG/DL — SIGNIFICANT CHANGE UP (ref 0.7–1.5)
GLUCOSE SERPL-MCNC: 202 MG/DL — HIGH (ref 70–99)
HCT VFR BLD CALC: 39.7 % — LOW (ref 42–52)
HGB BLD-MCNC: 13.3 G/DL — LOW (ref 14–18)
MCHC RBC-ENTMCNC: 29.4 PG — SIGNIFICANT CHANGE UP (ref 27–31)
MCHC RBC-ENTMCNC: 33.5 G/DL — SIGNIFICANT CHANGE UP (ref 32–37)
MCV RBC AUTO: 87.6 FL — SIGNIFICANT CHANGE UP (ref 80–94)
NRBC # BLD: 0 /100 WBCS — SIGNIFICANT CHANGE UP (ref 0–0)
PLATELET # BLD AUTO: 216 K/UL — SIGNIFICANT CHANGE UP (ref 130–400)
POTASSIUM SERPL-MCNC: 4.4 MMOL/L — SIGNIFICANT CHANGE UP (ref 3.5–5)
POTASSIUM SERPL-SCNC: 4.4 MMOL/L — SIGNIFICANT CHANGE UP (ref 3.5–5)
PROT SERPL-MCNC: 6.2 G/DL — SIGNIFICANT CHANGE UP (ref 6–8)
RBC # BLD: 4.53 M/UL — LOW (ref 4.7–6.1)
RBC # FLD: 12.8 % — SIGNIFICANT CHANGE UP (ref 11.5–14.5)
SODIUM SERPL-SCNC: 140 MMOL/L — SIGNIFICANT CHANGE UP (ref 135–146)
WBC # BLD: 11.77 K/UL — HIGH (ref 4.8–10.8)
WBC # FLD AUTO: 11.77 K/UL — HIGH (ref 4.8–10.8)

## 2018-12-30 RX ORDER — CHLORHEXIDINE GLUCONATE 213 G/1000ML
1 SOLUTION TOPICAL
Qty: 0 | Refills: 0 | Status: DISCONTINUED | OUTPATIENT
Start: 2018-12-30 | End: 2019-01-01

## 2018-12-30 RX ORDER — METHADONE HYDROCHLORIDE 40 MG/1
70 TABLET ORAL DAILY
Qty: 0 | Refills: 0 | Status: DISCONTINUED | OUTPATIENT
Start: 2018-12-30 | End: 2018-12-30

## 2018-12-30 RX ORDER — CHLORHEXIDINE GLUCONATE 213 G/1000ML
1 SOLUTION TOPICAL ONCE
Qty: 0 | Refills: 0 | Status: DISCONTINUED | OUTPATIENT
Start: 2018-12-30 | End: 2018-12-30

## 2018-12-30 RX ORDER — METHADONE HYDROCHLORIDE 40 MG/1
70 TABLET ORAL DAILY
Qty: 0 | Refills: 0 | Status: DISCONTINUED | OUTPATIENT
Start: 2018-12-30 | End: 2019-01-01

## 2018-12-30 RX ADMIN — METHADONE HYDROCHLORIDE 70 MILLIGRAM(S): 40 TABLET ORAL at 10:18

## 2018-12-30 RX ADMIN — TICAGRELOR 90 MILLIGRAM(S): 90 TABLET ORAL at 17:09

## 2018-12-30 RX ADMIN — Medication 25 MILLIGRAM(S): at 17:09

## 2018-12-30 RX ADMIN — Medication 5 UNIT(S): at 12:16

## 2018-12-30 RX ADMIN — TICAGRELOR 90 MILLIGRAM(S): 90 TABLET ORAL at 06:48

## 2018-12-30 RX ADMIN — Medication 81 MILLIGRAM(S): at 12:10

## 2018-12-30 RX ADMIN — Medication 25 MILLIGRAM(S): at 06:48

## 2018-12-30 RX ADMIN — Medication 5 UNIT(S): at 08:00

## 2018-12-30 RX ADMIN — INSULIN GLARGINE 14 UNIT(S): 100 INJECTION, SOLUTION SUBCUTANEOUS at 22:35

## 2018-12-30 RX ADMIN — Medication 5 UNIT(S): at 16:59

## 2018-12-30 NOTE — PROGRESS NOTE ADULT - SUBJECTIVE AND OBJECTIVE BOX
SUBJ:No chest pain or shortness of breath      MEDICATIONS  (STANDING):  aspirin enteric coated 81 milliGRAM(s) Oral daily  atorvastatin 80 milliGRAM(s) Oral at bedtime  chlorhexidine 4% Liquid 1 Application(s) Topical once  insulin glargine Injectable (LANTUS) 14 Unit(s) SubCutaneous at bedtime  insulin lispro Injectable (HumaLOG) 5 Unit(s) SubCutaneous three times a day before meals  methadone    Tablet 70 milliGRAM(s) Oral daily  metoprolol tartrate 25 milliGRAM(s) Oral two times a day  sodium chloride 0.9%. 1000 milliLiter(s) (75 mL/Hr) IV Continuous <Continuous>  ticagrelor 90 milliGRAM(s) Oral two times a day    MEDICATIONS  (PRN):            Vital Signs Last 24 Hrs  T(C): 36.9 (30 Dec 2018 07:50), Max: 36.9 (30 Dec 2018 04:00)  T(F): 98.5 (30 Dec 2018 07:50), Max: 98.5 (30 Dec 2018 07:50)  HR: 58 (30 Dec 2018 10:15) (54 - 70)  BP: 116/55 (30 Dec 2018 10:15) (108/57 - 147/73)  BP(mean): 70 (30 Dec 2018 10:15) (70 - 103)  RR: 18 (30 Dec 2018 10:15) (16 - 24)  SpO2: 98% (30 Dec 2018 10:15) (97% - 99%)      ECG:NML    TTE:    LABS:                        15.7   8.31  )-----------( 252      ( 29 Dec 2018 10:40 )             45.1     12-29    138  |  95<L>  |  11  ----------------------------<  312<H>  4.1   |  24  |  0.8    Ca    9.9      29 Dec 2018 10:40    TPro  7.1  /  Alb  4.4  /  TBili  <0.2  /  DBili  x   /  AST  19  /  ALT  24  /  AlkPhos  110  12-29    CARDIAC MARKERS ( 29 Dec 2018 10:40 )  x     / <0.01 ng/mL / x     / x     / x          PT/INR - ( 29 Dec 2018 10:40 )   PT: 10.40 sec;   INR: 0.90 ratio         PTT - ( 29 Dec 2018 10:40 )  PTT:33.6 sec    I&O's Summary    29 Dec 2018 07:01  -  30 Dec 2018 07:00  --------------------------------------------------------  IN: 960 mL / OUT: 1625 mL / NET: -665 mL    30 Dec 2018 07:01  -  30 Dec 2018 10:48  --------------------------------------------------------  IN: 120 mL / OUT: 0 mL / NET: 120 mL      BNP

## 2018-12-30 NOTE — PROGRESS NOTE ADULT - SUBJECTIVE AND OBJECTIVE BOX
LENGTH OF HOSPITAL STAY: 1d    CHIEF COMPLAINT:   Patient is a 57 yo M who presents with a chief complaint of chest pain.    Had cath with PCI to RCA  yesterday.    HISTORY OF PRESENTING ILLNESS:   57 yo M with PMHx of DM, Anxiety, depression, who presents to the ED with sudden onset of chest pain and nausea which started 2 hr PTA. Chest pain still persisted while in the ER. EKG revealed ST elevation in inferior leads and pt was rushed to cath lab for emergent cath. Received stent to the RCA and admitted to the CCU.    PAST MEDICAL & SURGICAL HISTORY  PAST MEDICAL & SURGICAL HISTORY:  Depression  Neuropathy  Hypertension  Diabetes  No significant past surgical history    SOCIAL HISTORY:    ALLERGIES:  No Known Allergies    MEDICATIONS:  STANDING MEDICATIONS  aspirin enteric coated 81 milliGRAM(s) Oral daily  atorvastatin 80 milliGRAM(s) Oral at bedtime  chlorhexidine 4% Liquid 1 Application(s) Topical <User Schedule>  insulin glargine Injectable (LANTUS) 14 Unit(s) SubCutaneous at bedtime  insulin lispro Injectable (HumaLOG) 5 Unit(s) SubCutaneous three times a day before meals  methadone    Tablet 70 milliGRAM(s) Oral daily  metoprolol tartrate 25 milliGRAM(s) Oral two times a day  sodium chloride 0.9%. 1000 milliLiter(s) IV Continuous <Continuous>  ticagrelor 90 milliGRAM(s) Oral two times a day    PRN MEDICATIONS    VITALS:   T(F): 98.5  HR: 72  BP: 119/57  RR: 20  SpO2: 97%    LABS:                        15.7   8.31  )-----------( 252      ( 29 Dec 2018 10:40 )             45.1     12-29    138  |  95<L>  |  11  ----------------------------<  312<H>  4.1   |  24  |  0.8    Ca    9.9      29 Dec 2018 10:40    TPro  7.1  /  Alb  4.4  /  TBili  <0.2  /  DBili  x   /  AST  19  /  ALT  24  /  AlkPhos  110  12-29    PT/INR - ( 29 Dec 2018 10:40 )   PT: 10.40 sec;   INR: 0.90 ratio         PTT - ( 29 Dec 2018 10:40 )  PTT:33.6 sec          CARDIAC MARKERS ( 29 Dec 2018 10:40 )  x     / <0.01 ng/mL / x     / x     / x          RADIOLOGY:    PHYSICAL EXAM:  GEN: no acute distress  HEENT: moist mucous membranes   LUNGS: clear to auscultation bilaterally   HEART: S1/S2 present, RRR  ABD: soft, nontender, nondistended, bowel sounds present  EXT: no edema  NEURO: AAOX3 LENGTH OF HOSPITAL STAY: 1d    CHIEF COMPLAINT:   Patient is a 59 yo M who presents with a chief complaint of chest pain.    Had cath with PCI to RCA yesterday. Pt continues to leave the unit despite repeated attempts to redirect pt and emphasize the importance of compliance.     HISTORY OF PRESENTING ILLNESS:   59 yo M with PMHx of DM, anxiety, depression who presents to the ED with sudden onset of chest pain and nausea which started 2 hr PTA. Chest pain still persisted while in the ER. EKG revealed ST elevation in inferior leads and pt was rushed to cath lab for emergent cath. Received stent to the RCA and admitted to the CCU.    PAST MEDICAL & SURGICAL HISTORY  PAST MEDICAL & SURGICAL HISTORY:  Depression  Neuropathy  Hypertension  Diabetes  No significant past surgical history    SOCIAL HISTORY:    ALLERGIES:  No Known Allergies    MEDICATIONS:  STANDING MEDICATIONS  aspirin enteric coated 81 milliGRAM(s) Oral daily  atorvastatin 80 milliGRAM(s) Oral at bedtime  chlorhexidine 4% Liquid 1 Application(s) Topical <User Schedule>  insulin glargine Injectable (LANTUS) 14 Unit(s) SubCutaneous at bedtime  insulin lispro Injectable (HumaLOG) 5 Unit(s) SubCutaneous three times a day before meals  methadone    Tablet 70 milliGRAM(s) Oral daily  metoprolol tartrate 25 milliGRAM(s) Oral two times a day  sodium chloride 0.9%. 1000 milliLiter(s) IV Continuous <Continuous>  ticagrelor 90 milliGRAM(s) Oral two times a day    PRN MEDICATIONS    VITALS:   T(F): 98.5  HR: 72  BP: 119/57  RR: 20  SpO2: 97%    LABS:                        15.7   8.31  )-----------( 252      ( 29 Dec 2018 10:40 )             45.1     12-29    138  |  95<L>  |  11  ----------------------------<  312<H>  4.1   |  24  |  0.8    Ca    9.9      29 Dec 2018 10:40    TPro  7.1  /  Alb  4.4  /  TBili  <0.2  /  DBili  x   /  AST  19  /  ALT  24  /  AlkPhos  110  12-29    PT/INR - ( 29 Dec 2018 10:40 )   PT: 10.40 sec;   INR: 0.90 ratio         PTT - ( 29 Dec 2018 10:40 )  PTT:33.6 sec          CARDIAC MARKERS ( 29 Dec 2018 10:40 )  x     / <0.01 ng/mL / x     / x     / x          RADIOLOGY:    PHYSICAL EXAM:  GEN: no acute distress  HEENT: moist mucous membranes   LUNGS: clear to auscultation bilaterally   HEART: S1/S2 present, RRR  ABD: soft, nontender, nondistended, bowel sounds present  EXT: no edema  NEURO: AAOX3

## 2018-12-30 NOTE — PROGRESS NOTE ADULT - ASSESSMENT
59 yo M with PMHx of DM, Anxiety, depression, who presents to the ED with sudden onset of chest pain and nausea which started 2 hr PTA. Chest pain still persisted while in the ER. EKG revealed ST elevation in inferior leads and pt was rushed to cath lab for emergent cath. Received stent to the RCA and admitted to the CCU.    #STEMI  - s/p emergent cath with PCI to RCA 12/29  - c/w ASA, brilinta, lopessor, lipitor  - f/u echo  - trend cardiac enzymes    #DM  - lantus 14, lispro 5/5/5    #Methadone use  - c/w methadone 70 mg daily (dosage confirmed with methadone clinic)    #Dispo  - CCU 59 yo M with PMHx of DM, anxiety, depression, who presents to the ED with sudden onset of chest pain and nausea which started 2 hr PTA. Chest pain still persisted while in the ER. EKG revealed ST elevation in inferior leads and pt was rushed to cath lab for emergent cath. Received stent to the RCA and admitted to the CCU.    #STEMI  - s/p emergent cath with PCI to RCA 12/29  - c/w ASA, brilinta, lopessor, lipitor  - f/u echo  - trend cardiac enzymes    #DM  - lantus 14, lispro 5/5/5    #Methadone use  - c/w methadone 70 mg daily (dosage confirmed with methadone clinic)    #Elopement?  - pt continues to leave the unit despite multiple attempts to redirect pt and emphasize importance of compliance, has missed his labs draws because of this  - psych consult per Dr. Hemphill    #Dispo  - CCU 59 yo M with PMHx of DM, anxiety, depression, who presents to the ED with sudden onset of chest pain and nausea which started 2 hr PTA. Chest pain still persisted while in the ER. EKG revealed ST elevation in inferior leads and pt was rushed to cath lab for emergent cath. Received stent to the RCA and admitted to the CCU.    #STEMI  - s/p emergent cath with PCI to RCA 12/29  - c/w ASA, brilinta, lopessor, lipitor  - f/u echo  - trend cardiac enzymes    #DM  - lantus 14, lispro 5/5/5    #Methadone use  - c/w methadone 70 mg daily (dosage confirmed with methadone clinic)    #Elopement?  - pt continues to leave the unit despite multiple attempts to redirect pt and emphasize importance of compliance, has missed his labs draws because of this  - may consider psych consult per Dr. Hemphill if continues to elope    #Dispo  - CCU

## 2018-12-31 LAB
ALBUMIN SERPL ELPH-MCNC: 3.5 G/DL — SIGNIFICANT CHANGE UP (ref 3.5–5.2)
ALP SERPL-CCNC: 79 U/L — SIGNIFICANT CHANGE UP (ref 30–115)
ALT FLD-CCNC: 31 U/L — SIGNIFICANT CHANGE UP (ref 0–41)
ANION GAP SERPL CALC-SCNC: 12 MMOL/L — SIGNIFICANT CHANGE UP (ref 7–14)
ANION GAP SERPL CALC-SCNC: 16 MMOL/L — HIGH (ref 7–14)
AST SERPL-CCNC: 44 U/L — HIGH (ref 0–41)
BILIRUB SERPL-MCNC: 0.3 MG/DL — SIGNIFICANT CHANGE UP (ref 0.2–1.2)
BUN SERPL-MCNC: 14 MG/DL — SIGNIFICANT CHANGE UP (ref 10–20)
BUN SERPL-MCNC: 14 MG/DL — SIGNIFICANT CHANGE UP (ref 10–20)
CALCIUM SERPL-MCNC: 8.8 MG/DL — SIGNIFICANT CHANGE UP (ref 8.5–10.1)
CALCIUM SERPL-MCNC: 8.9 MG/DL — SIGNIFICANT CHANGE UP (ref 8.5–10.1)
CHLORIDE SERPL-SCNC: 101 MMOL/L — SIGNIFICANT CHANGE UP (ref 98–110)
CHLORIDE SERPL-SCNC: 103 MMOL/L — SIGNIFICANT CHANGE UP (ref 98–110)
CHOLEST SERPL-MCNC: 132 MG/DL — SIGNIFICANT CHANGE UP (ref 100–200)
CK MB CFR SERPL CALC: 14 NG/ML — HIGH (ref 0.6–6.3)
CK MB CFR SERPL CALC: 7.2 NG/ML — HIGH (ref 0.6–6.3)
CK MB CFR SERPL CALC: 8.7 NG/ML — HIGH (ref 0.6–6.3)
CK SERPL-CCNC: 194 U/L — SIGNIFICANT CHANGE UP (ref 0–225)
CK SERPL-CCNC: 236 U/L — HIGH (ref 0–225)
CK SERPL-CCNC: 330 U/L — HIGH (ref 0–225)
CO2 SERPL-SCNC: 23 MMOL/L — SIGNIFICANT CHANGE UP (ref 17–32)
CO2 SERPL-SCNC: 26 MMOL/L — SIGNIFICANT CHANGE UP (ref 17–32)
CREAT SERPL-MCNC: 0.7 MG/DL — SIGNIFICANT CHANGE UP (ref 0.7–1.5)
CREAT SERPL-MCNC: 0.7 MG/DL — SIGNIFICANT CHANGE UP (ref 0.7–1.5)
ESTIMATED AVERAGE GLUCOSE: 217 MG/DL — HIGH (ref 68–114)
GLUCOSE SERPL-MCNC: 191 MG/DL — HIGH (ref 70–99)
GLUCOSE SERPL-MCNC: 219 MG/DL — HIGH (ref 70–99)
HBA1C BLD-MCNC: 9.2 % — HIGH (ref 4–5.6)
HCT VFR BLD CALC: 38.7 % — LOW (ref 42–52)
HDLC SERPL-MCNC: 38 MG/DL — LOW
HGB BLD-MCNC: 13.2 G/DL — LOW (ref 14–18)
LIPID PNL WITH DIRECT LDL SERPL: 81 MG/DL — SIGNIFICANT CHANGE UP (ref 4–129)
MCHC RBC-ENTMCNC: 29.9 PG — SIGNIFICANT CHANGE UP (ref 27–31)
MCHC RBC-ENTMCNC: 34.1 G/DL — SIGNIFICANT CHANGE UP (ref 32–37)
MCV RBC AUTO: 87.6 FL — SIGNIFICANT CHANGE UP (ref 80–94)
NRBC # BLD: 0 /100 WBCS — SIGNIFICANT CHANGE UP (ref 0–0)
PLATELET # BLD AUTO: 193 K/UL — SIGNIFICANT CHANGE UP (ref 130–400)
POTASSIUM SERPL-MCNC: 4.2 MMOL/L — SIGNIFICANT CHANGE UP (ref 3.5–5)
POTASSIUM SERPL-MCNC: 4.3 MMOL/L — SIGNIFICANT CHANGE UP (ref 3.5–5)
POTASSIUM SERPL-SCNC: 4.2 MMOL/L — SIGNIFICANT CHANGE UP (ref 3.5–5)
POTASSIUM SERPL-SCNC: 4.3 MMOL/L — SIGNIFICANT CHANGE UP (ref 3.5–5)
PROT SERPL-MCNC: 5.9 G/DL — LOW (ref 6–8)
RBC # BLD: 4.42 M/UL — LOW (ref 4.7–6.1)
RBC # FLD: 12.6 % — SIGNIFICANT CHANGE UP (ref 11.5–14.5)
SODIUM SERPL-SCNC: 140 MMOL/L — SIGNIFICANT CHANGE UP (ref 135–146)
SODIUM SERPL-SCNC: 141 MMOL/L — SIGNIFICANT CHANGE UP (ref 135–146)
TOTAL CHOLESTEROL/HDL RATIO MEASUREMENT: 3.5 RATIO — LOW (ref 4–5.5)
TRIGL SERPL-MCNC: 108 MG/DL — SIGNIFICANT CHANGE UP (ref 10–149)
TROPONIN T SERPL-MCNC: 1.28 NG/ML — CRITICAL HIGH
TROPONIN T SERPL-MCNC: 1.33 NG/ML — CRITICAL HIGH
TROPONIN T SERPL-MCNC: 1.66 NG/ML — CRITICAL HIGH
WBC # BLD: 8.86 K/UL — SIGNIFICANT CHANGE UP (ref 4.8–10.8)
WBC # FLD AUTO: 8.86 K/UL — SIGNIFICANT CHANGE UP (ref 4.8–10.8)

## 2018-12-31 RX ORDER — TICAGRELOR 90 MG/1
1 TABLET ORAL
Qty: 60 | Refills: 0 | OUTPATIENT
Start: 2018-12-31 | End: 2019-01-29

## 2018-12-31 RX ORDER — METHADONE HYDROCHLORIDE 40 MG/1
7 TABLET ORAL
Qty: 0 | Refills: 0 | COMMUNITY
Start: 2018-12-31

## 2018-12-31 RX ORDER — METOPROLOL TARTRATE 50 MG
1 TABLET ORAL
Qty: 60 | Refills: 0 | OUTPATIENT
Start: 2018-12-31 | End: 2019-01-29

## 2018-12-31 RX ORDER — GABAPENTIN 400 MG/1
1 CAPSULE ORAL
Qty: 0 | Refills: 0 | COMMUNITY

## 2018-12-31 RX ORDER — METFORMIN HYDROCHLORIDE 850 MG/1
1 TABLET ORAL
Qty: 60 | Refills: 0 | OUTPATIENT
Start: 2018-12-31 | End: 2019-01-29

## 2018-12-31 RX ORDER — SERTRALINE 25 MG/1
0 TABLET, FILM COATED ORAL
Qty: 0 | Refills: 0 | COMMUNITY

## 2018-12-31 RX ORDER — METFORMIN HYDROCHLORIDE 850 MG/1
0 TABLET ORAL
Qty: 0 | Refills: 0 | COMMUNITY

## 2018-12-31 RX ORDER — SERTRALINE 25 MG/1
1 TABLET, FILM COATED ORAL
Qty: 60 | Refills: 0 | OUTPATIENT
Start: 2018-12-31 | End: 2019-01-29

## 2018-12-31 RX ORDER — ATORVASTATIN CALCIUM 80 MG/1
1 TABLET, FILM COATED ORAL
Qty: 30 | Refills: 0 | OUTPATIENT
Start: 2018-12-31 | End: 2019-01-29

## 2018-12-31 RX ORDER — INSULIN LISPRO 100/ML
6 VIAL (ML) SUBCUTANEOUS ONCE
Qty: 0 | Refills: 0 | Status: COMPLETED | OUTPATIENT
Start: 2018-12-31 | End: 2018-12-31

## 2018-12-31 RX ORDER — GABAPENTIN 400 MG/1
1 CAPSULE ORAL
Qty: 90 | Refills: 0 | OUTPATIENT
Start: 2018-12-31 | End: 2019-01-29

## 2018-12-31 RX ADMIN — Medication 5 UNIT(S): at 08:47

## 2018-12-31 RX ADMIN — Medication 25 MILLIGRAM(S): at 17:45

## 2018-12-31 RX ADMIN — TICAGRELOR 90 MILLIGRAM(S): 90 TABLET ORAL at 06:43

## 2018-12-31 RX ADMIN — Medication 6 UNIT(S): at 21:42

## 2018-12-31 RX ADMIN — Medication 5 UNIT(S): at 12:24

## 2018-12-31 RX ADMIN — Medication 25 MILLIGRAM(S): at 06:43

## 2018-12-31 RX ADMIN — Medication 81 MILLIGRAM(S): at 12:07

## 2018-12-31 RX ADMIN — TICAGRELOR 90 MILLIGRAM(S): 90 TABLET ORAL at 17:45

## 2018-12-31 RX ADMIN — Medication 5 UNIT(S): at 17:45

## 2018-12-31 RX ADMIN — CHLORHEXIDINE GLUCONATE 1 APPLICATION(S): 213 SOLUTION TOPICAL at 06:44

## 2018-12-31 RX ADMIN — METHADONE HYDROCHLORIDE 70 MILLIGRAM(S): 40 TABLET ORAL at 12:07

## 2018-12-31 RX ADMIN — INSULIN GLARGINE 14 UNIT(S): 100 INJECTION, SOLUTION SUBCUTANEOUS at 21:42

## 2018-12-31 NOTE — PROGRESS NOTE ADULT - ASSESSMENT
59 yo M with PMHx of DM, anxiety, depression, who presents to the ED with sudden onset of chest pain and nausea which started 2 hr PTA. Chest pain still persisted while in the ER. EKG revealed ST elevation in inferior leads and pt was rushed to cath lab for emergent cath. Received stent to the RCA and admitted to the CCU.    #STEMI  - s/p emergent cath with PCI to RCA 12/29  - c/w ASA, brilinta, lopessor, lipitor  - echo < from: Transthoracic Echocardiogram (12.30.18 @ 10:51) >   LV Ejection Fraction by Jensen's Method with a biplane EF of 52 %.   2. Mildly decreased global left ventricular systolic function.   3. RCA distribution is abnormal as described above.   4. Mild tricuspid regurgitation.   5. Mild mitral regurgitation.       Continue DAPT,(asa 81mg daily, brilinta 90mg q12)  B-Blocker, Statin Therapy      no Ace due to low BP       brilinta coverage confirmed, $3 copay/month, as per cath cath lab NP  - trend cardiac enzymes - Trop 1.66 12/31    #DM  - lantus 14, lispro 5/5/5  takes metformin at home    #Methadone use  - c/w methadone 70 mg daily (dosage confirmed with methadone clinic)    DC home after methadone dose in AM and if cleared by Dr Hemphill.

## 2018-12-31 NOTE — PROGRESS NOTE ADULT - SUBJECTIVE AND OBJECTIVE BOX
SUBJECTIVE:    Patient is a 58y old Male who presents with a chief complaint of Chest Pain (31 Dec 2018 10:52)    Currently admitted to medicine with the primary diagnosis of STEMI (ST elevation myocardial infarction)     Today is hospital day 2d.     PAST MEDICAL & SURGICAL HISTORY  Depression  Neuropathy  Hypertension  Diabetes  No significant past surgical history    ALLERGIES:  No Known Allergies    MEDICATIONS:  STANDING MEDICATIONS  aspirin enteric coated 81 milliGRAM(s) Oral daily  atorvastatin 80 milliGRAM(s) Oral at bedtime  chlorhexidine 4% Liquid 1 Application(s) Topical <User Schedule>  insulin glargine Injectable (LANTUS) 14 Unit(s) SubCutaneous at bedtime  insulin lispro Injectable (HumaLOG) 5 Unit(s) SubCutaneous three times a day before meals  methadone    Tablet 70 milliGRAM(s) Oral daily  metoprolol tartrate 25 milliGRAM(s) Oral two times a day  sodium chloride 0.9%. 1000 milliLiter(s) IV Continuous <Continuous>  ticagrelor 90 milliGRAM(s) Oral two times a day    PRN MEDICATIONS    VITALS:   T(F): 98.3  HR: 52  BP: 94/59  RR: 20  SpO2: 97%    LABS:                        13.2   8.86  )-----------( 193      ( 31 Dec 2018 06:14 )             38.7     12-31    141  |  103  |  14  ----------------------------<  219<H>  4.2   |  26  |  0.7    Ca    8.9      31 Dec 2018 06:14    TPro  5.9<L>  /  Alb  3.5  /  TBili  0.3  /  DBili  x   /  AST  44<H>  /  ALT  31  /  AlkPhos  79  12-31          Creatine Kinase, Serum: 330 U/L <H> (12-31-18 @ 06:14)  Troponin T, Serum: 1.66 ng/mL <HH> (12-31-18 @ 06:14)      CARDIAC MARKERS ( 31 Dec 2018 06:14 )  x     / 1.66 ng/mL / 330 U/L / x     / 14.0 ng/mL      RADIOLOGY:    PHYSICAL EXAM:  GEN: No acute distress  LUNGS: Clear to auscultation bilaterally   HEART: S1/S2 present. RRR.   ABD/ GI: Soft, non-tender, non-distended. Bowel sounds present  EXT: NC/NC/NE/2+PP/GILBERT  NEURO: AAOX3

## 2018-12-31 NOTE — PROGRESS NOTE ADULT - SUBJECTIVE AND OBJECTIVE BOX
Cardiology Follow up    HAIDER LIZARRAGA   58yMale  PAST MEDICAL & SURGICAL HISTORY:  Depression  Neuropathy  Hypertension  Diabetes  No significant past surgical history    Allergies    No Known Allergies    Intolerances        Patient without complaints. Pt ambulated without issues/symptoms  Denies CP, SOB, palpitations, or dizziness  s.yecenia  on telemetry overnight    Vital Signs Last 24 Hrs  T(C): 36.8 (31 Dec 2018 08:00), Max: 37.1 (30 Dec 2018 20:53)  T(F): 98.3 (31 Dec 2018 08:00), Max: 98.7 (30 Dec 2018 20:53)  HR: 52 (31 Dec 2018 08:00) (52 - 72)  BP: 94/59 (31 Dec 2018 08:00) (86/53 - 119/57)  BP(mean): 67 (31 Dec 2018 08:00) (63 - 84)  RR: 20 (31 Dec 2018 08:00) (18 - 26)  SpO2: 97% (30 Dec 2018 12:15) (97% - 98%)Allergies    REVIEW OF SYSTEMS:    CONSTITUTIONAL: No weakness, fevers or chills  EYES/ENT: No visual changes;  No vertigo or throat pain   NECK: No pain or stiffness  RESPIRATORY: No cough, wheezing, hemoptysis; No shortness of breath  CARDIOVASCULAR: No chest pain or palpitations  GASTROINTESTINAL: No abdominal or epigastric pain. No nausea, vomiting, or hematemesis; No diarrhea or constipation. No melena or hematochezia.  GENITOURINARY: No dysuria, frequency or hematuria  NEUROLOGICAL: No numbness or weakness  SKIN: No itching, rashes      NAD, appears well  S1S2, no murmurs, no JVD  CTA B/L, no wheeze, no rales  SNT +BS  Ext:    Right Groin:  NO hematoma or bleeding    NO bruit,  C/D/I   , + pulses    Pulses:  +Rad/ +PTs /+DPs/ same as baseline  A&Ox 3    EKG      Ventricular Rate 54 BPM    Atrial Rate 54 BPM    P-R Interval 154 ms    QRS Duration 80 ms    Q-T Interval 492 ms    QTC Calculation(Bezet) 466 ms    P Axis 54 degrees    R Axis -15 degrees    T Axis -45 degrees    Diagnosis Line Sinus bradycardia  Inferior infarct , age undetermined  Anterolateral infarct , age undetermined  Marked T-wave abnormality, consider inferolateral ischemia  Abnormal ECG    Confirmed by Angel Brady MD ([id number]) on 12/31/2018 8:31:47 A                                                                                                               2D ECHO   EXAM:  2-D ECHO (TTE) COMPLETE        PROCEDURE DATE:  12/30/2018      INTERPRETATION:  REPORT:    TRANSTHORACIC ECHOCARDIOGRAM REPORT         Patient Name:   HAIDER LIZARRAGA Accession #: [id number]  Medical Rec #:  [id number]             Height:      69.0 in 175.3 cm  YOB: 1960             Weight:      156.0 lb 70.76 kg  Patient Age:    58 years              BSA:         1.86 m²  Patient Gender: M                     BP:          111/57 mmHg       Date of Exam:        12/30/2018 10:51:40 AM  Referring Physician: [id number] JUDAH CASTILLO  Sonographer:         Torrey Marinelli  Reading Physician:   Angel Brady M.D.    Procedure:   2D Echo/Doppler/Color Doppler Complete.  Indications: R07.9 - Chest Pain, unspecified  Diagnosis:   R07.9 - Chest pain, unspecified         Summary:   1. LV Ejection Fraction by Jensen's Method with a biplane EF of 52 %.   2. Mildly decreased global left ventricular systolic function.   3. RCA distribution is abnormal as described above.   4. Mild tricuspid regurgitation.   5. Mild mitral regurgitation.    PHYSICIAN INTERPRETATION:  Left Ventricle: The left ventricular internal cavity size is normal. Left   ventricular wall thickness is normal. Global LV systolic function was   mildly decreased. LV Ejection Fraction by Jensen's Method with a biplane   EF of 52 %.       LV Wall Scoring:  The RCA distribution is hypokinetic.    Right Ventricle: Normal right ventricular size and function.  Left Atrium: Normal left atrial size.  Right Atrium: Normal right atrial size.  Pericardium: There is no evidence of pericardial effusion.  Mitral Valve: Mild mitral valve regurgitation is seen. The mitral valve   is normal in structure.  Tricuspid Valve: Mild tricuspid regurgitation is visualized. The   tricuspid valve is normal.  Aortic Valve: Normal trileaflet aortic valve with normal opening. No   evidence of aortic stenosis. No aortic regurgitation.  Pulmonic Valve: The pulmonic valve is normal.  Aorta: Aortic root measured at sinotubular junction is normal.  Pulmonary Artery: The main pulmonary artery is normal in size.  Venous: The inferior vena cava was normal sized, with respiratory size   variation greater than 50%.       2D AND M-MODE MEASUREMENTS (normal ranges within parentheses):  Left Ventricle:                  Normal   Aorta/Left Atrium:               Normal  IVSd (2D):              1.10 cm (0.7-1.1) AoV Cusp Separation: 1.84 cm   (1.5-2.6)  LVPWd (2D):             1.01 cm (0.7-1.1) Left Atrium (Mmode): 4.04 cm   (1.9-4.0)  LVIDd (2D):             4.56 cm (3.4-5.7) Right Ventricle:  LVIDs (2D):             3.06 cm           RVd (2D):        3.30 cm  LV FS (2D):             32.8 %   (>25%)  Relative Wall Thickness  0.44    (<0.42)    SPECTRAL DOPPLER ANALYSIS:  LVDIASTOLIC FUNCTION:  MV Peak E: 0.71 m/s Decel Time: 172 msec  MV Peak A: 0.62 m/s  E/A Ratio: 1.14    Aortic Valve:  AoV VMax:    1.38 m/s AoV Area, Vmax: 2.52 cm² Vmax Indx: 1.35 cm²/m²  AoV Pk Grad: 7.6 mmHg    LVOT Vmax: 1.16 m/s  LVOT VTI:  0.24m  LVOT Diam: 1.96 cm    Mitral Valve:  MV P1/2 Time: 49.75 msec  MV Area, PHT: 4.42 cm²    Tricuspid Valve and PA/RV Systolic Pressure: TR Max Velocity: 2.45 m/s RA   Pressure: 3 mmHg RVSP/PASP: 26.9 mmHg       [id number] Angel Brady M.D., Electronically signed on 12/30/2018 at   2:17:15 PM         *** Final ***                    ANGEL BRADY M.D., ATTENDING CARDIOLOGIST  This document has been electronically signed. Dec 30 2018 10:51AM                LABS                        13.2   8.86  )-----------( 193      ( 31 Dec 2018 06:14 )             38.7     12-31    141  |  103  |  14  ----------------------------<  219<H>  4.2   |  26  |  0.7    Ca    8.9      31 Dec 2018 06:14    TPro  5.9<L>  /  Alb  3.5  /  TBili  0.3  /  DBili  x   /  AST  44<H>  /  ALT  31  /  AlkPhos  79  12-31    CARDIAC MARKERS ( 31 Dec 2018 06:14 )  x     / 1.66 ng/mL / 330 U/L / x     / 14.0 ng/mL  CARDIAC MARKERS ( 29 Dec 2018 10:40 )  x     / <0.01 ng/mL / x     / x     / x          LIVER FUNCTIONS - ( 31 Dec 2018 06:14 )  Alb: 3.5 g/dL / Pro: 5.9 g/dL / ALK PHOS: 79 U/L / ALT: 31 U/L / AST: 44 U/L / GGT: x                 S/P PCI RCA/STEMI  	         Continue DAPT,(asa 81mg daily, brilinta 90mg q12)  B-Blocker, Statin Therapy                    no Ace due to low BP                    confirm brilinta coverage prior to d/c                    oob-ch, ambulate                    smoking cessation education reinforced                   Pt given instructions on importance of taking antiplatelet medication or risk acute stent thrombosis/death                   Post cath instructions, access site care and activity restrictions reviewed with patient                     Discussed with patient to return to hospital if experience chest pain, shortness breath, dizziness and site bleeding                   Aggressive risk factor modification , diet counseling, smoking cessation discussed with patient                       f/u with Dr. Hemphill regarding disposition Cardiology Follow up    HAIDER LIZARRAGA   58yMale  PAST MEDICAL & SURGICAL HISTORY:  Depression  Neuropathy  Hypertension  Diabetes  No significant past surgical history    Allergies    No Known Allergies    Intolerances        Patient without complaints. Pt ambulated without issues/symptoms  Denies CP, SOB, palpitations, or dizziness  s.yecenia  on telemetry overnight    Vital Signs Last 24 Hrs  T(C): 36.8 (31 Dec 2018 08:00), Max: 37.1 (30 Dec 2018 20:53)  T(F): 98.3 (31 Dec 2018 08:00), Max: 98.7 (30 Dec 2018 20:53)  HR: 52 (31 Dec 2018 08:00) (52 - 72)  BP: 94/59 (31 Dec 2018 08:00) (86/53 - 119/57)  BP(mean): 67 (31 Dec 2018 08:00) (63 - 84)  RR: 20 (31 Dec 2018 08:00) (18 - 26)  SpO2: 97% (30 Dec 2018 12:15) (97% - 98%)Allergies    REVIEW OF SYSTEMS:    CONSTITUTIONAL: No weakness, fevers or chills  EYES/ENT: No visual changes;  No vertigo or throat pain   NECK: No pain or stiffness  RESPIRATORY: No cough, wheezing, hemoptysis; No shortness of breath  CARDIOVASCULAR: No chest pain or palpitations  GASTROINTESTINAL: No abdominal or epigastric pain. No nausea, vomiting, or hematemesis; No diarrhea or constipation. No melena or hematochezia.  GENITOURINARY: No dysuria, frequency or hematuria  NEUROLOGICAL: No numbness or weakness  SKIN: No itching, rashes      NAD, appears well  S1S2, no murmurs, no JVD  CTA B/L, no wheeze, no rales  SNT +BS  Ext:    Right Groin:  NO hematoma or bleeding    NO bruit,  C/D/I   , + pulses    Pulses:  +Rad/ +PTs /+DPs/ same as baseline  A&Ox 3    EKG      Ventricular Rate 54 BPM    Atrial Rate 54 BPM    P-R Interval 154 ms    QRS Duration 80 ms    Q-T Interval 492 ms    QTC Calculation(Bezet) 466 ms    P Axis 54 degrees    R Axis -15 degrees    T Axis -45 degrees    Diagnosis Line Sinus bradycardia  Inferior infarct , age undetermined  Anterolateral infarct , age undetermined  Marked T-wave abnormality, consider inferolateral ischemia  Abnormal ECG    Confirmed by Angel Brady MD ([id number]) on 12/31/2018 8:31:47 A                                                                                                               2D ECHO   EXAM:  2-D ECHO (TTE) COMPLETE        PROCEDURE DATE:  12/30/2018      INTERPRETATION:  REPORT:    TRANSTHORACIC ECHOCARDIOGRAM REPORT         Patient Name:   HAIDER LIZARRAGA Accession #: [id number]  Medical Rec #:  [id number]             Height:      69.0 in 175.3 cm  YOB: 1960             Weight:      156.0 lb 70.76 kg  Patient Age:    58 years              BSA:         1.86 m²  Patient Gender: M                     BP:          111/57 mmHg       Date of Exam:        12/30/2018 10:51:40 AM  Referring Physician: [id number] JUDAH CASTILLO  Sonographer:         Torrey Marinelli  Reading Physician:   Angel Brady M.D.    Procedure:   2D Echo/Doppler/Color Doppler Complete.  Indications: R07.9 - Chest Pain, unspecified  Diagnosis:   R07.9 - Chest pain, unspecified         Summary:   1. LV Ejection Fraction by Jensen's Method with a biplane EF of 52 %.   2. Mildly decreased global left ventricular systolic function.   3. RCA distribution is abnormal as described above.   4. Mild tricuspid regurgitation.   5. Mild mitral regurgitation.    PHYSICIAN INTERPRETATION:  Left Ventricle: The left ventricular internal cavity size is normal. Left   ventricular wall thickness is normal. Global LV systolic function was   mildly decreased. LV Ejection Fraction by Jensen's Method with a biplane   EF of 52 %.       LV Wall Scoring:  The RCA distribution is hypokinetic.    Right Ventricle: Normal right ventricular size and function.  Left Atrium: Normal left atrial size.  Right Atrium: Normal right atrial size.  Pericardium: There is no evidence of pericardial effusion.  Mitral Valve: Mild mitral valve regurgitation is seen. The mitral valve   is normal in structure.  Tricuspid Valve: Mild tricuspid regurgitation is visualized. The   tricuspid valve is normal.  Aortic Valve: Normal trileaflet aortic valve with normal opening. No   evidence of aortic stenosis. No aortic regurgitation.  Pulmonic Valve: The pulmonic valve is normal.  Aorta: Aortic root measured at sinotubular junction is normal.  Pulmonary Artery: The main pulmonary artery is normal in size.  Venous: The inferior vena cava was normal sized, with respiratory size   variation greater than 50%.       2D AND M-MODE MEASUREMENTS (normal ranges within parentheses):  Left Ventricle:                  Normal   Aorta/Left Atrium:               Normal  IVSd (2D):              1.10 cm (0.7-1.1) AoV Cusp Separation: 1.84 cm   (1.5-2.6)  LVPWd (2D):             1.01 cm (0.7-1.1) Left Atrium (Mmode): 4.04 cm   (1.9-4.0)  LVIDd (2D):             4.56 cm (3.4-5.7) Right Ventricle:  LVIDs (2D):             3.06 cm           RVd (2D):        3.30 cm  LV FS (2D):             32.8 %   (>25%)  Relative Wall Thickness  0.44    (<0.42)    SPECTRAL DOPPLER ANALYSIS:  LVDIASTOLIC FUNCTION:  MV Peak E: 0.71 m/s Decel Time: 172 msec  MV Peak A: 0.62 m/s  E/A Ratio: 1.14    Aortic Valve:  AoV VMax:    1.38 m/s AoV Area, Vmax: 2.52 cm² Vmax Indx: 1.35 cm²/m²  AoV Pk Grad: 7.6 mmHg    LVOT Vmax: 1.16 m/s  LVOT VTI:  0.24m  LVOT Diam: 1.96 cm    Mitral Valve:  MV P1/2 Time: 49.75 msec  MV Area, PHT: 4.42 cm²    Tricuspid Valve and PA/RV Systolic Pressure: TR Max Velocity: 2.45 m/s RA   Pressure: 3 mmHg RVSP/PASP: 26.9 mmHg       [id number] Angel Brady M.D., Electronically signed on 12/30/2018 at   2:17:15 PM         *** Final ***                    ANGEL BRADY M.D., ATTENDING CARDIOLOGIST  This document has been electronically signed. Dec 30 2018 10:51AM                LABS                        13.2   8.86  )-----------( 193      ( 31 Dec 2018 06:14 )             38.7     12-31    141  |  103  |  14  ----------------------------<  219<H>  4.2   |  26  |  0.7    Ca    8.9      31 Dec 2018 06:14    TPro  5.9<L>  /  Alb  3.5  /  TBili  0.3  /  DBili  x   /  AST  44<H>  /  ALT  31  /  AlkPhos  79  12-31    CARDIAC MARKERS ( 31 Dec 2018 06:14 )  x     / 1.66 ng/mL / 330 U/L / x     / 14.0 ng/mL  CARDIAC MARKERS ( 29 Dec 2018 10:40 )  x     / <0.01 ng/mL / x     / x     / x          LIVER FUNCTIONS - ( 31 Dec 2018 06:14 )  Alb: 3.5 g/dL / Pro: 5.9 g/dL / ALK PHOS: 79 U/L / ALT: 31 U/L / AST: 44 U/L / GGT: x                 S/P PCI RCA/STEMI  	         Continue DAPT,(asa 81mg daily, brilinta 90mg q12)  B-Blocker, Statin Therapy                    no Ace due to low BP                    brilinta coverage confirmed, $3 copay/month, pt aware                    oob-ch, ambulate                    smoking cessation education reinforced                   Pt given instructions on importance of taking antiplatelet medication or risk acute stent thrombosis/death                   Post cath instructions, access site care and activity restrictions reviewed with patient                     Discussed with patient to return to hospital if experience chest pain, shortness breath, dizziness and site bleeding                   Aggressive risk factor modification , diet counseling, smoking cessation discussed with patient                       f/u with Dr. Hemphill regarding disposition

## 2018-12-31 NOTE — PROGRESS NOTE ADULT - ASSESSMENT
59 yo M with PMHx of DM, anxiety, depression, who presents to the ED with sudden onset of chest pain and nausea which started 2 hr PTA. Chest pain still persisted while in the ER. EKG revealed ST elevation in inferior leads and pt was rushed to cath lab for emergent cath. Received stent to the RCA and admitted to the CCU.    #STEMI  - s/p emergent cath with PCI to RCA 12/29  - c/w ASA, brilinta, lopessor, lipitor  - echo LV Ejection Fraction by Jensen's Method with a biplane EF of 52 %. Mildly decreased global left ventricular systolic function  - trend cardiac enzymes - Trop 1.66 12/31    #DM  - lantus 14, lispro 5/5/5    #Methadone use  - c/w methadone 70 mg daily (dosage confirmed with methadone clinic)    #Elopement?  - pt continues to leave the unit despite multiple attempts to redirect pt and emphasize importance of compliance, has missed his labs draws because of this  - may consider psych consult per Dr. Hemphill if continues to elope    #Dispo  - CCU 59 yo M with PMHx of DM, anxiety, depression, who presents to the ED with sudden onset of chest pain and nausea which started 2 hr PTA. Chest pain still persisted while in the ER. EKG revealed ST elevation in inferior leads and pt was rushed to cath lab for emergent cath. Received stent to the RCA and admitted to the CCU.    #STEMI  - s/p emergent cath with PCI to RCA 12/29  - c/w ASA, brilinta, lopessor, lipitor  - echo LV Ejection Fraction by Jensen's Method with a biplane EF of 52 %. Mildly decreased global left ventricular systolic function  - trend cardiac enzymes - Trop 1.66 12/31    #DM  - lantus 14, lispro 5/5/5    #Methadone use  - c/w methadone 70 mg daily (dosage confirmed with methadone clinic)    #Elopement?  - pt continues to leave the unit despite multiple attempts to redirect pt and emphasize importance of compliance, has missed his labs draws because of this  - may consider psych consult per Dr. Hemphill if continues to elope    #Dispo  -Tele

## 2018-12-31 NOTE — PROGRESS NOTE ADULT - SUBJECTIVE AND OBJECTIVE BOX
SUBJ:No chest pain or shortness of breath      MEDICATIONS  (STANDING):  aspirin enteric coated 81 milliGRAM(s) Oral daily  atorvastatin 80 milliGRAM(s) Oral at bedtime  chlorhexidine 4% Liquid 1 Application(s) Topical <User Schedule>  insulin glargine Injectable (LANTUS) 14 Unit(s) SubCutaneous at bedtime  insulin lispro Injectable (HumaLOG) 5 Unit(s) SubCutaneous three times a day before meals  methadone    Tablet 70 milliGRAM(s) Oral daily  metoprolol tartrate 25 milliGRAM(s) Oral two times a day  sodium chloride 0.9%. 1000 milliLiter(s) (75 mL/Hr) IV Continuous <Continuous>  ticagrelor 90 milliGRAM(s) Oral two times a day    MEDICATIONS  (PRN):            Vital Signs Last 24 Hrs  T(C): 36.8 (31 Dec 2018 08:00), Max: 37.1 (30 Dec 2018 20:53)  T(F): 98.3 (31 Dec 2018 08:00), Max: 98.7 (30 Dec 2018 20:53)  HR: 52 (31 Dec 2018 08:00) (52 - 72)  BP: 94/59 (31 Dec 2018 08:00) (86/53 - 119/57)  BP(mean): 67 (31 Dec 2018 08:00) (63 - 84)  RR: 20 (31 Dec 2018 08:00) (18 - 26)  SpO2: 97% (30 Dec 2018 12:15) (97% - 97%)      ECG:NML    TTE:    LABS:                        13.2   8.86  )-----------( 193      ( 31 Dec 2018 06:14 )             38.7     12-31    141  |  103  |  14  ----------------------------<  219<H>  4.2   |  26  |  0.7    Ca    8.9      31 Dec 2018 06:14    TPro  5.9<L>  /  Alb  3.5  /  TBili  0.3  /  DBili  x   /  AST  44<H>  /  ALT  31  /  AlkPhos  79  12-31    CARDIAC MARKERS ( 31 Dec 2018 06:14 )  x     / 1.66 ng/mL / 330 U/L / x     / 14.0 ng/mL          I&O's Summary    30 Dec 2018 07:01  -  31 Dec 2018 07:00  --------------------------------------------------------  IN: 600 mL / OUT: 700 mL / NET: -100 mL      BNP

## 2018-12-31 NOTE — CHART NOTE - NSCHARTNOTEFT_GEN_A_CORE
ICU DOWNGRADE NOTE:    58y Male transferred to floor from ICU    Patient is a 58y old Male who presents with a chief complaint of Chest Pain (31 Dec 2018 11:10)    The patient is currently admitted for the primary diagnosis of STEMI (ST elevation myocardial infarction)    The patient was admitted to the unit for 3 Days.    The patient was not on pressors or intubated.    Indwelling vascular catheters: bilateral IVs     Disposition: Telemetry    Code Status: Full code     ICU COURSE OF EVENTS:  -------------------------------------------------------------------------------------------57 yo M with PMHx of DM, anxiety, depression who presents to the ED with sudden onset of chest pain and nausea which started 2 hr PTA. Chest pain still persisted while in the ER. EKG revealed ST elevation in inferior leads and pt was rushed to cath lab for emergent cath. Received stent to the RCA and admitted to the CCU.    Patient s/p one stent to RCA, Echo - EF of 52% biplane, mildly decreased LV systolic function, Troponin 1.66 12/31 up from 12/29 <0.01, DM controlled with lantus 14 and lispro 5/5/5, patient was started on Methadone 70 mg daily(dosage was confirmed with methadone clinic). Patient continues to leave the unit depite multiple in-length discussions with the patient about the risks, also has missed lab draws due to patient not being present. Patient otherwise clinically asymptomatic at bedside at this time, stable for continued monitoring and trending of cardiac enzymes on telemetry.     - F/u with Dr. Villa  - Trend cardiac enzymes  - Continue DAPT,(asa 81mg daily, brilinta 90mg q12)  B-Blocker, Statin Therapy. No ACE as per Dr. Duggan note due to low BP, Patients BP at bedside MAP of 119 - f/u regarding ACE inhibitor         -------------------------------------------------------------------------------------------    Current workup in progress:    SIGN OUT AT 12-31-18 @ 12:20 GIVEN TO:
Methadone dose confirmed at 70 mg daily with [facility].

## 2018-12-31 NOTE — PROGRESS NOTE ADULT - SUBJECTIVE AND OBJECTIVE BOX
LENGTH OF HOSPITAL STAY: 3d    CHIEF COMPLAINT:   Patient is a 59 yo M who presents with a chief complaint of chest pain.    Had cath with PCI to RCA. Pt continues to leave the unit despite repeated attempts to redirect pt and emphasize the importance of compliance.     HISTORY OF PRESENTING ILLNESS:   59 yo M with PMHx of DM, anxiety, depression who presents to the ED with sudden onset of chest pain and nausea which started 2 hr PTA. Chest pain still persisted while in the ER. EKG revealed ST elevation in inferior leads and pt was rushed to cath lab for emergent cath. Received stent to the RCA and admitted to the CCU.    PAST MEDICAL & SURGICAL HISTORY  PAST MEDICAL & SURGICAL HISTORY:  Depression  Neuropathy  Hypertension  Diabetes  No significant past surgical history    ALLERGIES:  No Known Allergies    MEDICATIONS:  MEDICATIONS  (STANDING):  aspirin enteric coated 81 milliGRAM(s) Oral daily  atorvastatin 80 milliGRAM(s) Oral at bedtime  chlorhexidine 4% Liquid 1 Application(s) Topical <User Schedule>  insulin glargine Injectable (LANTUS) 14 Unit(s) SubCutaneous at bedtime  insulin lispro Injectable (HumaLOG) 5 Unit(s) SubCutaneous three times a day before meals  methadone    Tablet 70 milliGRAM(s) Oral daily  metoprolol tartrate 25 milliGRAM(s) Oral two times a day  sodium chloride 0.9%. 1000 milliLiter(s) (75 mL/Hr) IV Continuous <Continuous>  ticagrelor 90 milliGRAM(s) Oral two times a day    VITALS:   ICU Vital Signs Last 24 Hrs  T(C): 36.8 (31 Dec 2018 08:00), Max: 37.1 (30 Dec 2018 20:53)  T(F): 98.3 (31 Dec 2018 08:00), Max: 98.7 (30 Dec 2018 20:53)  HR: 52 (31 Dec 2018 08:00) (52 - 72)  BP: 94/59 (31 Dec 2018 08:00) (86/53 - 119/57)  BP(mean): 67 (31 Dec 2018 08:00) (63 - 84)  RR: 20 (31 Dec 2018 08:00) (18 - 26)  SpO2: 97% (30 Dec 2018 12:15) (97% - 97%)      LABS:                         13.2   8.86  )-----------( 193      ( 31 Dec 2018 06:14 )             38.7     12-31    141  |  103  |  14  ----------------------------<  219<H>  4.2   |  26  |  0.7    Ca    8.9      31 Dec 2018 06:14    TPro  5.9<L>  /  Alb  3.5  /  TBili  0.3  /  DBili  x   /  AST  44<H>  /  ALT  31  /  AlkPhos  79  12-31      PT/INR - ( 29 Dec 2018 10:40 )   PT: 10.40 sec;   INR: 0.90 ratio         PTT - ( 29 Dec 2018 10:40 )  PTT:33.6 sec    CARDIAC MARKERS ( 29 Dec 2018 10:40 )  x     / <0.01 ng/mL / x     / x     / x          RADIOLOGY:    PHYSICAL EXAM:  GEN: no acute distress  HEENT: moist mucous membranes   LUNGS: clear to auscultation bilaterally   HEART: S1/S2 present, RRR  ABD: soft, nontender, nondistended, bowel sounds present  EXT: no edema  NEURO: AAOX3

## 2019-01-01 ENCOUNTER — TRANSCRIPTION ENCOUNTER (OUTPATIENT)
Age: 59
End: 2019-01-01

## 2019-01-01 VITALS — HEART RATE: 62 BPM

## 2019-01-01 LAB
CK MB CFR SERPL CALC: 5 NG/ML — SIGNIFICANT CHANGE UP (ref 0.6–6.3)
CK SERPL-CCNC: 137 U/L — SIGNIFICANT CHANGE UP (ref 0–225)
HCT VFR BLD CALC: 37.7 % — LOW (ref 42–52)
HGB BLD-MCNC: 12.7 G/DL — LOW (ref 14–18)
MCHC RBC-ENTMCNC: 29.5 PG — SIGNIFICANT CHANGE UP (ref 27–31)
MCHC RBC-ENTMCNC: 33.7 G/DL — SIGNIFICANT CHANGE UP (ref 32–37)
MCV RBC AUTO: 87.5 FL — SIGNIFICANT CHANGE UP (ref 80–94)
NRBC # BLD: 0 /100 WBCS — SIGNIFICANT CHANGE UP (ref 0–0)
PLATELET # BLD AUTO: 192 K/UL — SIGNIFICANT CHANGE UP (ref 130–400)
RBC # BLD: 4.31 M/UL — LOW (ref 4.7–6.1)
RBC # FLD: 12.7 % — SIGNIFICANT CHANGE UP (ref 11.5–14.5)
TROPONIN T SERPL-MCNC: 1.23 NG/ML — CRITICAL HIGH
WBC # BLD: 8.22 K/UL — SIGNIFICANT CHANGE UP (ref 4.8–10.8)
WBC # FLD AUTO: 8.22 K/UL — SIGNIFICANT CHANGE UP (ref 4.8–10.8)

## 2019-01-01 RX ORDER — NICOTINE POLACRILEX 2 MG
1 GUM BUCCAL
Qty: 20 | Refills: 0 | OUTPATIENT
Start: 2019-01-01 | End: 2019-01-20

## 2019-01-01 RX ORDER — ASPIRIN/CALCIUM CARB/MAGNESIUM 324 MG
1 TABLET ORAL
Qty: 30 | Refills: 3 | OUTPATIENT
Start: 2019-01-01 | End: 2019-04-30

## 2019-01-01 RX ORDER — ASPIRIN/CALCIUM CARB/MAGNESIUM 324 MG
1 TABLET ORAL
Qty: 0 | Refills: 0 | COMMUNITY

## 2019-01-01 RX ADMIN — Medication 81 MILLIGRAM(S): at 11:32

## 2019-01-01 RX ADMIN — TICAGRELOR 90 MILLIGRAM(S): 90 TABLET ORAL at 06:19

## 2019-01-01 RX ADMIN — METHADONE HYDROCHLORIDE 70 MILLIGRAM(S): 40 TABLET ORAL at 11:32

## 2019-01-01 RX ADMIN — Medication 25 MILLIGRAM(S): at 06:19

## 2019-01-01 RX ADMIN — Medication 5 UNIT(S): at 08:12

## 2019-01-01 RX ADMIN — Medication 5 UNIT(S): at 12:05

## 2019-01-01 NOTE — DISCHARGE NOTE ADULT - PATIENT PORTAL LINK FT
You can access the [facility] Patient Portal, offered by [facility], by registering with the following website: [email or web address]

## 2019-01-01 NOTE — DISCHARGE NOTE ADULT - MEDICATION SUMMARY - MEDICATIONS TO TAKE
I will START or STAY ON the medications listed below when I get home from the hospital:    methadone 10 mg oral tablet  -- 7 tab(s) by mouth once a day  -- Indication: For Neuropathic pain    aspirin 81 mg oral tablet, chewable  -- 1 tab(s) by mouth once a day  -- Indication: For STEMI (ST elevation myocardial infarction)    enalapril 2.5 mg oral tablet  -- 1 tab(s) by mouth once a day  -- Indication: For STEMI (ST elevation myocardial infarction)    gabapentin 300 mg oral capsule  -- 1 cap(s) by mouth 3 times a day  -- Indication: For Neuropathy    sertraline 25 mg oral tablet  -- 1 tab(s) by mouth 2 times a day   -- It is very important that you take or use this exactly as directed.  Do not skip doses or discontinue unless directed by your doctor.  May cause drowsiness.  Alcohol may intensify this effect.  Use care when operating dangerous machinery.  Obtain medical advice before taking any non-prescription drugs as some may affect the action of this medication.    -- Indication: For depression    metFORMIN 850 mg oral tablet  -- 1 tab(s) by mouth 2 times a day   -- Indication: For DM    atorvastatin 80 mg oral tablet  -- 1 tab(s) by mouth once a day (at bedtime)  -- Indication: For STEMI (ST elevation myocardial infarction)    ticagrelor 90 mg oral tablet  -- 1 tab(s) by mouth 2 times a day MDD:2  -- It is very important that you take or use this exactly as directed.  Do not skip doses or discontinue unless directed by your doctor.  Obtain medical advice before taking any non-prescription drugs as some may affect the action of this medication.    -- Indication: For STEMI (ST elevation myocardial infarction)    metoprolol tartrate 25 mg oral tablet  -- 1 tab(s) by mouth 2 times a day  -- Indication: For STEMI (ST elevation myocardial infarction)    Habitrol 21 mg/24 hr transdermal film, extended release  -- 1 patch transdermally once a day   -- Do not take this drug if you are pregnant.  For external use only.  It is very important that you take or use this exactly as directed.  Do not skip doses or discontinue unless directed by your doctor.  Remove old patch prior to applying a new patch.    -- Indication: For tobacco abuse

## 2019-01-01 NOTE — DISCHARGE NOTE ADULT - CARE PROVIDER_API CALL
Pio Lehman), Internal Medicine  [address] [place] [address]  Phone: [phone]  Fax: [phone]    Gabriele Hemphill), Cardiovascular Disease; Internal Medicine; Interventional Cardiology  [address] [place] [address]  Phone: [phone]  Fax: [phone]

## 2019-01-01 NOTE — PROGRESS NOTE ADULT - ATTENDING COMMENTS
pt w/o cp,sob. pt may d/c in pm on meds w/ f/u 1-2w or sonleesa prn.
follow on meds. transfer to telemetry.
s/p mi stent. monitor in unit. check echo. enzymes.

## 2019-01-01 NOTE — DISCHARGE NOTE ADULT - PLAN OF CARE
s/p PCI and RCA stent continue ASA, Brilinta,   Cardiology follow up in 1-2 weeks  Quit alcohol and smoking Continue Metformin Continue Enalapril and Metoprolol Continue Methadone and Neurontin.

## 2019-01-01 NOTE — DISCHARGE NOTE ADULT - CARE PROVIDERS DIRECT ADDRESSES
[email or web address]

## 2019-01-01 NOTE — DISCHARGE NOTE ADULT - CARE PLAN
Principal Discharge DX:	ST elevation myocardial infarction involving right coronary artery  Goal:	s/p PCI and RCA stent  Assessment and plan of treatment:	continue ASA, Brilinta,   Cardiology follow up in 1-2 weeks  Quit alcohol and smoking  Secondary Diagnosis:	Type 2 diabetes mellitus with other circulatory complication  Assessment and plan of treatment:	Continue Metformin  Secondary Diagnosis:	Essential hypertension  Assessment and plan of treatment:	Continue Enalapril and Metoprolol  Secondary Diagnosis:	Neuropathy  Assessment and plan of treatment:	Continue Methadone and Neurontin.

## 2019-01-01 NOTE — PROGRESS NOTE ADULT - SUBJECTIVE AND OBJECTIVE BOX
SUBJ:No chest pain or shortness of breath      MEDICATIONS  (STANDING):  aspirin enteric coated 81 milliGRAM(s) Oral daily  atorvastatin 80 milliGRAM(s) Oral at bedtime  chlorhexidine 4% Liquid 1 Application(s) Topical <User Schedule>  insulin glargine Injectable (LANTUS) 14 Unit(s) SubCutaneous at bedtime  insulin lispro Injectable (HumaLOG) 5 Unit(s) SubCutaneous three times a day before meals  methadone    Tablet 70 milliGRAM(s) Oral daily  metoprolol tartrate 25 milliGRAM(s) Oral two times a day  sodium chloride 0.9%. 1000 milliLiter(s) (75 mL/Hr) IV Continuous <Continuous>  ticagrelor 90 milliGRAM(s) Oral two times a day    MEDICATIONS  (PRN):            Vital Signs Last 24 Hrs  T(C): 36.5 (01 Jan 2019 05:58), Max: 37.1 (31 Dec 2018 12:31)  T(F): 97.7 (01 Jan 2019 05:58), Max: 98.7 (31 Dec 2018 12:31)  HR: 62 (01 Jan 2019 06:17) (54 - 66)  BP: 110/61 (01 Jan 2019 05:58) (108/55 - 122/65)  BP(mean): 81 (31 Dec 2018 12:31) (81 - 81)  RR: 18 (01 Jan 2019 05:58) (17 - 20)  SpO2: 97% (31 Dec 2018 22:16) (97% - 97%)      ECG:NML    TTE:    LABS:                        12.7   8.22  )-----------( 192      ( 01 Jan 2019 07:14 )             37.7     12-31    140  |  101  |  14  ----------------------------<  191<H>  4.3   |  23  |  0.7    Ca    8.8      31 Dec 2018 17:00    TPro  5.9<L>  /  Alb  3.5  /  TBili  0.3  /  DBili  x   /  AST  44<H>  /  ALT  31  /  AlkPhos  79  12-31    CARDIAC MARKERS ( 01 Jan 2019 07:14 )  x     / 1.23 ng/mL / 137 U/L / x     / 5.0 ng/mL  CARDIAC MARKERS ( 31 Dec 2018 21:24 )  x     / 1.28 ng/mL / 194 U/L / x     / 7.2 ng/mL  CARDIAC MARKERS ( 31 Dec 2018 17:00 )  x     / 1.33 ng/mL / 236 U/L / x     / 8.7 ng/mL  CARDIAC MARKERS ( 31 Dec 2018 06:14 )  x     / 1.66 ng/mL / 330 U/L / x     / 14.0 ng/mL          I&O's Summary    BNP

## 2019-01-02 ENCOUNTER — RX RENEWAL (OUTPATIENT)
Age: 59
End: 2019-01-02

## 2019-01-04 DIAGNOSIS — E11.9 TYPE 2 DIABETES MELLITUS WITHOUT COMPLICATIONS: ICD-10-CM

## 2019-01-04 DIAGNOSIS — R07.9 CHEST PAIN, UNSPECIFIED: ICD-10-CM

## 2019-01-04 DIAGNOSIS — Z79.84 LONG TERM (CURRENT) USE OF ORAL HYPOGLYCEMIC DRUGS: ICD-10-CM

## 2019-01-04 DIAGNOSIS — F41.8 OTHER SPECIFIED ANXIETY DISORDERS: ICD-10-CM

## 2019-01-04 DIAGNOSIS — G89.29 OTHER CHRONIC PAIN: ICD-10-CM

## 2019-01-04 DIAGNOSIS — M79.672 PAIN IN LEFT FOOT: ICD-10-CM

## 2019-01-04 DIAGNOSIS — I21.3 ST ELEVATION (STEMI) MYOCARDIAL INFARCTION OF UNSPECIFIED SITE: ICD-10-CM

## 2019-01-04 DIAGNOSIS — I10 ESSENTIAL (PRIMARY) HYPERTENSION: ICD-10-CM

## 2019-01-17 ENCOUNTER — APPOINTMENT (OUTPATIENT)
Dept: INTERNAL MEDICINE | Facility: HOSPITAL | Age: 59
End: 2019-01-17

## 2019-01-17 ENCOUNTER — OUTPATIENT (OUTPATIENT)
Dept: OUTPATIENT SERVICES | Facility: HOSPITAL | Age: 59
LOS: 1 days | Discharge: HOME | End: 2019-01-17

## 2019-01-17 VITALS
DIASTOLIC BLOOD PRESSURE: 60 MMHG | TEMPERATURE: 98.9 F | WEIGHT: 158 LBS | BODY MASS INDEX: 25.39 KG/M2 | HEIGHT: 66.25 IN | SYSTOLIC BLOOD PRESSURE: 116 MMHG | HEART RATE: 64 BPM

## 2019-01-17 DIAGNOSIS — E11.29 TYPE 2 DIABETES MELLITUS WITH OTHER DIABETIC KIDNEY COMPLICATION: ICD-10-CM

## 2019-01-17 DIAGNOSIS — E78.00 PURE HYPERCHOLESTEROLEMIA, UNSPECIFIED: ICD-10-CM

## 2019-01-17 DIAGNOSIS — F17.200 NICOTINE DEPENDENCE, UNSPECIFIED, UNCOMPLICATED: ICD-10-CM

## 2019-01-17 DIAGNOSIS — E11.40 TYPE 2 DIABETES MELLITUS WITH DIABETIC NEUROPATHY, UNSPECIFIED: ICD-10-CM

## 2019-01-17 DIAGNOSIS — R80.9 PROTEINURIA, UNSPECIFIED: ICD-10-CM

## 2019-01-17 DIAGNOSIS — I25.10 ATHEROSCLEROTIC HEART DISEASE OF NATIVE CORONARY ARTERY WITHOUT ANGINA PECTORIS: ICD-10-CM

## 2019-01-17 RX ORDER — SERTRALINE HYDROCHLORIDE 50 MG/1
50 TABLET, FILM COATED ORAL
Qty: 12 | Refills: 0 | Status: DISCONTINUED | COMMUNITY
End: 2019-01-17

## 2019-01-17 NOTE — HEALTH RISK ASSESSMENT
[] : No [No falls in past year] : Patient reported no falls in the past year [de-identified] : pt. was started Habitrol

## 2019-01-17 NOTE — COUNSELING
[Weight management counseling provided] : Weight management [Healthy eating counseling provided] : healthy eating [Activity counseling provided] : activity [Low Fat Diet] : Low fat diet [Decrease Portions] : Decrease food portions [Low Salt Diet] : Low salt diet [Walking] : Walking [de-identified] : 2000 calories low sodium, low fat/chol. high fiber ADA diet

## 2019-01-17 NOTE — ASSESSMENT
[FreeTextEntry1] : 01.  CAD s/p stent RCA: on metoprolol, enalapril, Brilinta, ASA.  Sen Dr. Villa yesterday\par a.  continue current meds.\par b.  follow strict diet: 2000 calories low sodium, low fat/chol. high fiber ADA diet\par c.  follow cardiologist as scheduled in 3 weeks\par d.  smoking cessation\par 02.  NIDDM with microalbuminuria/neuropathy: on basaglar, metformin, enalapril, neurontin, ASA\par a.  continue current management\par b.  follow up visit in 3 months\par 03.  Hypercholesterolemia: pt. resume on pravastatin instead of atorvastatin\par a.  continue pravastatin\par 04.  Smoking: on Habitrol\par a.  smoking cessation counseling done\par b.  pt. will continue Habitrol\par 05.  HCM\par a.  complete blood work as ordered\par b.  pt. declined to see mental health counselor, denies any suicidal/homicidal ideation\par c.  advised pt. to scheduled GI for colorectal cancer screening\par d.  reschedule optometry appointment

## 2019-01-17 NOTE — HISTORY OF PRESENT ILLNESS
[FreeTextEntry1] : Pt. is a 59yo male, was hospitalized 12/29/18 for chest pain.  Pt. immediately cath and had stent placed in RCA.  Pt. was treated on metoprolol, Brilinta, enalapril, atorvastatin and ASA while in hospital.  Pt. was discharged on 1/1/19, here for follow up his CAD.  Pt. denies any chest pain, no shortness of breath, no palpitation, no n/v/diarrhea, no polyuria/polydipsia/polyphagia.  Pt. seen cardiologist Dr. Villa.

## 2019-01-28 ENCOUNTER — RX RENEWAL (OUTPATIENT)
Age: 59
End: 2019-01-28

## 2019-02-03 ENCOUNTER — RX RENEWAL (OUTPATIENT)
Age: 59
End: 2019-02-03

## 2019-02-05 ENCOUNTER — APPOINTMENT (OUTPATIENT)
Dept: PODIATRY | Facility: HOSPITAL | Age: 59
End: 2019-02-05

## 2019-02-06 ENCOUNTER — RX RENEWAL (OUTPATIENT)
Age: 59
End: 2019-02-06

## 2019-02-14 ENCOUNTER — APPOINTMENT (OUTPATIENT)
Dept: PODIATRY | Facility: HOSPITAL | Age: 59
End: 2019-02-14

## 2019-03-12 ENCOUNTER — OUTPATIENT (OUTPATIENT)
Dept: OUTPATIENT SERVICES | Facility: HOSPITAL | Age: 59
LOS: 1 days | Discharge: HOME | End: 2019-03-12

## 2019-03-12 DIAGNOSIS — E11.9 TYPE 2 DIABETES MELLITUS WITHOUT COMPLICATIONS: ICD-10-CM

## 2019-03-12 DIAGNOSIS — H52.4 PRESBYOPIA: ICD-10-CM

## 2019-03-14 ENCOUNTER — RX RENEWAL (OUTPATIENT)
Age: 59
End: 2019-03-14

## 2019-03-18 LAB
BASOPHILS # BLD AUTO: 0.12 K/UL
BASOPHILS NFR BLD AUTO: 1.7 %
EOSINOPHIL # BLD AUTO: 0.34 K/UL
EOSINOPHIL NFR BLD AUTO: 4.9 %
ESTIMATED AVERAGE GLUCOSE: 203 MG/DL
HBA1C MFR BLD HPLC: 8.7 %
HCT VFR BLD CALC: 43.5 %
HGB BLD-MCNC: 14.2 G/DL
LYMPHOCYTES # BLD AUTO: 1.78 K/UL
LYMPHOCYTES NFR BLD AUTO: 25.4 %
MAN DIFF?: NORMAL
MCHC RBC-ENTMCNC: 29.2 PG
MCHC RBC-ENTMCNC: 32.6 G/DL
MCV RBC AUTO: 89.5 FL
MONOCYTES # BLD AUTO: 0.79 K/UL
MONOCYTES NFR BLD AUTO: 11.3 %
NEUTROPHILS # BLD AUTO: 3.96 K/UL
NEUTROPHILS NFR BLD AUTO: 56.4 %
PLATELET # BLD AUTO: 252 K/UL
RBC # BLD: 4.86 M/UL
RBC # FLD: 12.6 %
WBC # FLD AUTO: 7.01 K/UL

## 2019-03-19 LAB
ALBUMIN SERPL ELPH-MCNC: 4.4 G/DL
ALP BLD-CCNC: 103 U/L
ALT SERPL-CCNC: 27 U/L
ANION GAP SERPL CALC-SCNC: 14 MMOL/L
AST SERPL-CCNC: 17 U/L
BILIRUB SERPL-MCNC: <0.2 MG/DL
BUN SERPL-MCNC: 12 MG/DL
CALCIUM SERPL-MCNC: 9.1 MG/DL
CHLORIDE SERPL-SCNC: 99 MMOL/L
CHOLEST SERPL-MCNC: 146 MG/DL
CHOLEST/HDLC SERPL: 3.6 RATIO
CO2 SERPL-SCNC: 25 MMOL/L
CREAT SERPL-MCNC: 0.7 MG/DL
GLUCOSE SERPL-MCNC: 222 MG/DL
HDLC SERPL-MCNC: 40 MG/DL
LDLC SERPL CALC-MCNC: 75 MG/DL
POTASSIUM SERPL-SCNC: 5 MMOL/L
PROT SERPL-MCNC: 6.6 G/DL
SODIUM SERPL-SCNC: 138 MMOL/L
TRIGL SERPL-MCNC: 173 MG/DL

## 2019-04-02 ENCOUNTER — RX RENEWAL (OUTPATIENT)
Age: 59
End: 2019-04-02

## 2019-04-10 ENCOUNTER — OUTPATIENT (OUTPATIENT)
Dept: OUTPATIENT SERVICES | Facility: HOSPITAL | Age: 59
LOS: 1 days | Discharge: HOME | End: 2019-04-10

## 2019-04-10 DIAGNOSIS — Z00.8 ENCOUNTER FOR OTHER GENERAL EXAMINATION: ICD-10-CM

## 2019-04-11 ENCOUNTER — APPOINTMENT (OUTPATIENT)
Dept: INTERNAL MEDICINE | Facility: HOSPITAL | Age: 59
End: 2019-04-11

## 2019-04-17 ENCOUNTER — APPOINTMENT (OUTPATIENT)
Dept: INTERNAL MEDICINE | Facility: HOSPITAL | Age: 59
End: 2019-04-17

## 2019-04-18 LAB
ALBUMIN SERPL ELPH-MCNC: 4.3 G/DL — SIGNIFICANT CHANGE UP (ref 3.5–5.2)
ALP SERPL-CCNC: 104 U/L — SIGNIFICANT CHANGE UP (ref 30–115)
ALT FLD-CCNC: 27 U/L — SIGNIFICANT CHANGE UP (ref 0–41)
ANION GAP SERPL CALC-SCNC: 12 MMOL/L — SIGNIFICANT CHANGE UP (ref 7–14)
APPEARANCE UR: CLEAR — SIGNIFICANT CHANGE UP
AST SERPL-CCNC: 17 U/L — SIGNIFICANT CHANGE UP (ref 0–41)
BILIRUB DIRECT SERPL-MCNC: <0.2 MG/DL — SIGNIFICANT CHANGE UP (ref 0–0.2)
BILIRUB INDIRECT FLD-MCNC: 0 MG/DL — SIGNIFICANT CHANGE UP (ref 0.2–1.2)
BILIRUB SERPL-MCNC: <0.2 MG/DL — SIGNIFICANT CHANGE UP (ref 0.2–1.2)
BILIRUB UR-MCNC: NEGATIVE — SIGNIFICANT CHANGE UP
BUN SERPL-MCNC: 10 MG/DL — SIGNIFICANT CHANGE UP (ref 10–20)
CALCIUM SERPL-MCNC: 9.8 MG/DL — SIGNIFICANT CHANGE UP (ref 8.5–10.1)
CHLORIDE SERPL-SCNC: 100 MMOL/L — SIGNIFICANT CHANGE UP (ref 98–110)
CHOLEST SERPL-MCNC: 139 MG/DL — SIGNIFICANT CHANGE UP (ref 100–200)
CO2 SERPL-SCNC: 27 MMOL/L — SIGNIFICANT CHANGE UP (ref 17–32)
COLOR SPEC: YELLOW — SIGNIFICANT CHANGE UP
CREAT SERPL-MCNC: 0.8 MG/DL — SIGNIFICANT CHANGE UP (ref 0.7–1.5)
DIFF PNL FLD: NEGATIVE — SIGNIFICANT CHANGE UP
ESTIMATED AVERAGE GLUCOSE: 229 MG/DL — HIGH (ref 68–114)
GLUCOSE SERPL-MCNC: 270 MG/DL — HIGH (ref 70–99)
GLUCOSE UR QL: 500 MG/DL
HBA1C BLD-MCNC: 9.6 % — HIGH (ref 4–5.6)
HCT VFR BLD CALC: 42.5 % — SIGNIFICANT CHANGE UP (ref 42–52)
HDLC SERPL-MCNC: 38 MG/DL — LOW
HGB BLD-MCNC: 14.5 G/DL — SIGNIFICANT CHANGE UP (ref 14–18)
KETONES UR-MCNC: ABNORMAL
LEUKOCYTE ESTERASE UR-ACNC: NEGATIVE — SIGNIFICANT CHANGE UP
LIPID PNL WITH DIRECT LDL SERPL: 88 MG/DL — SIGNIFICANT CHANGE UP (ref 4–129)
MAGNESIUM SERPL-MCNC: 2.1 MG/DL — SIGNIFICANT CHANGE UP (ref 1.8–2.4)
MCHC RBC-ENTMCNC: 30.2 PG — SIGNIFICANT CHANGE UP (ref 27–31)
MCHC RBC-ENTMCNC: 34.1 G/DL — SIGNIFICANT CHANGE UP (ref 32–37)
MCV RBC AUTO: 88.5 FL — SIGNIFICANT CHANGE UP (ref 80–94)
NITRITE UR-MCNC: NEGATIVE — SIGNIFICANT CHANGE UP
NRBC # BLD: 0 /100 WBCS — SIGNIFICANT CHANGE UP (ref 0–0)
PH UR: 5.5 — SIGNIFICANT CHANGE UP (ref 5–8)
PLATELET # BLD AUTO: 242 K/UL — SIGNIFICANT CHANGE UP (ref 130–400)
POTASSIUM SERPL-MCNC: 4.7 MMOL/L — SIGNIFICANT CHANGE UP (ref 3.5–5)
POTASSIUM SERPL-SCNC: 4.7 MMOL/L — SIGNIFICANT CHANGE UP (ref 3.5–5)
PROT SERPL-MCNC: 6.8 G/DL — SIGNIFICANT CHANGE UP (ref 6–8)
PROT UR-MCNC: NEGATIVE MG/DL — SIGNIFICANT CHANGE UP
RBC # BLD: 4.8 M/UL — SIGNIFICANT CHANGE UP (ref 4.7–6.1)
RBC # FLD: 12.5 % — SIGNIFICANT CHANGE UP (ref 11.5–14.5)
SODIUM SERPL-SCNC: 139 MMOL/L — SIGNIFICANT CHANGE UP (ref 135–146)
SP GR SPEC: 1.02 — SIGNIFICANT CHANGE UP (ref 1.01–1.03)
TOTAL CHOLESTEROL/HDL RATIO MEASUREMENT: 3.7 RATIO — LOW (ref 4–5.5)
TRIGL SERPL-MCNC: 148 MG/DL — SIGNIFICANT CHANGE UP (ref 10–149)
UROBILINOGEN FLD QL: 0.2 MG/DL — SIGNIFICANT CHANGE UP (ref 0.2–0.2)
WBC # BLD: 8.25 K/UL — SIGNIFICANT CHANGE UP (ref 4.8–10.8)
WBC # FLD AUTO: 8.25 K/UL — SIGNIFICANT CHANGE UP (ref 4.8–10.8)

## 2019-04-19 LAB
HAV IGM SER-ACNC: SIGNIFICANT CHANGE UP
HBV CORE IGM SER-ACNC: SIGNIFICANT CHANGE UP
HBV SURFACE AG SER-ACNC: SIGNIFICANT CHANGE UP
HCV AB S/CO SERPL IA: 0.16 S/CO — SIGNIFICANT CHANGE UP (ref 0–0.99)
HCV AB SERPL-IMP: SIGNIFICANT CHANGE UP
T PALLIDUM AB TITR SER: NEGATIVE — SIGNIFICANT CHANGE UP

## 2019-04-25 ENCOUNTER — OUTPATIENT (OUTPATIENT)
Dept: OUTPATIENT SERVICES | Facility: HOSPITAL | Age: 59
LOS: 1 days | Discharge: HOME | End: 2019-04-25

## 2019-04-25 DIAGNOSIS — I49.9 CARDIAC ARRHYTHMIA, UNSPECIFIED: ICD-10-CM

## 2019-04-29 ENCOUNTER — RX RENEWAL (OUTPATIENT)
Age: 59
End: 2019-04-29

## 2019-05-02 ENCOUNTER — APPOINTMENT (OUTPATIENT)
Dept: INTERNAL MEDICINE | Facility: HOSPITAL | Age: 59
End: 2019-05-02

## 2019-05-07 ENCOUNTER — RX RENEWAL (OUTPATIENT)
Age: 59
End: 2019-05-07

## 2019-05-15 ENCOUNTER — RX RENEWAL (OUTPATIENT)
Age: 59
End: 2019-05-15

## 2019-05-19 ENCOUNTER — RX RENEWAL (OUTPATIENT)
Age: 59
End: 2019-05-19

## 2019-05-23 ENCOUNTER — RX RENEWAL (OUTPATIENT)
Age: 59
End: 2019-05-23

## 2019-06-05 ENCOUNTER — RX RENEWAL (OUTPATIENT)
Age: 59
End: 2019-06-05

## 2019-06-10 ENCOUNTER — RX RENEWAL (OUTPATIENT)
Age: 59
End: 2019-06-10

## 2019-06-13 ENCOUNTER — RX RENEWAL (OUTPATIENT)
Age: 59
End: 2019-06-13

## 2019-06-20 ENCOUNTER — APPOINTMENT (OUTPATIENT)
Dept: INTERNAL MEDICINE | Facility: HOSPITAL | Age: 59
End: 2019-06-20

## 2019-07-03 ENCOUNTER — APPOINTMENT (OUTPATIENT)
Dept: INTERNAL MEDICINE | Facility: HOSPITAL | Age: 59
End: 2019-07-03

## 2019-07-29 ENCOUNTER — RX RENEWAL (OUTPATIENT)
Age: 59
End: 2019-07-29

## 2019-08-01 ENCOUNTER — APPOINTMENT (OUTPATIENT)
Dept: INTERNAL MEDICINE | Facility: HOSPITAL | Age: 59
End: 2019-08-01

## 2019-08-01 ENCOUNTER — OUTPATIENT (OUTPATIENT)
Dept: OUTPATIENT SERVICES | Facility: HOSPITAL | Age: 59
LOS: 1 days | Discharge: HOME | End: 2019-08-01

## 2019-08-01 ENCOUNTER — RX RENEWAL (OUTPATIENT)
Age: 59
End: 2019-08-01

## 2019-08-01 VITALS
BODY MASS INDEX: 25.23 KG/M2 | RESPIRATION RATE: 16 BRPM | HEART RATE: 72 BPM | SYSTOLIC BLOOD PRESSURE: 130 MMHG | DIASTOLIC BLOOD PRESSURE: 76 MMHG | TEMPERATURE: 98.6 F | WEIGHT: 157 LBS | HEIGHT: 66.25 IN

## 2019-08-01 DIAGNOSIS — E11.9 TYPE 2 DIABETES MELLITUS WITHOUT COMPLICATIONS: ICD-10-CM

## 2019-08-01 DIAGNOSIS — E78.00 PURE HYPERCHOLESTEROLEMIA, UNSPECIFIED: ICD-10-CM

## 2019-08-01 DIAGNOSIS — M62.838 OTHER MUSCLE SPASM: ICD-10-CM

## 2019-08-01 DIAGNOSIS — I25.10 ATHEROSCLEROTIC HEART DISEASE OF NATIVE CORONARY ARTERY WITHOUT ANGINA PECTORIS: ICD-10-CM

## 2019-08-01 DIAGNOSIS — F17.200 NICOTINE DEPENDENCE, UNSPECIFIED, UNCOMPLICATED: ICD-10-CM

## 2019-08-01 DIAGNOSIS — E11.40 TYPE 2 DIABETES MELLITUS WITH DIABETIC NEUROPATHY, UNSPECIFIED: ICD-10-CM

## 2019-08-01 RX ORDER — NICOTINE 21 MG/24HR
21 PATCH, TRANSDERMAL 24 HOURS TRANSDERMAL DAILY
Qty: 42 | Refills: 0 | Status: COMPLETED | COMMUNITY
Start: 1900-01-01 | End: 2019-09-12

## 2019-08-09 LAB
25(OH)D3 SERPL-MCNC: 46 NG/ML
ALBUMIN SERPL ELPH-MCNC: 4.4 G/DL
ALP BLD-CCNC: 112 U/L
ALT SERPL-CCNC: 21 U/L
ANION GAP SERPL CALC-SCNC: 14 MMOL/L
AST SERPL-CCNC: 14 U/L
BASOPHILS # BLD AUTO: 0.13 K/UL
BASOPHILS NFR BLD AUTO: 1.7 %
BILIRUB SERPL-MCNC: 0.2 MG/DL
BUN SERPL-MCNC: 15 MG/DL
CALCIUM SERPL-MCNC: 9.9 MG/DL
CHLORIDE SERPL-SCNC: 100 MMOL/L
CHOLEST SERPL-MCNC: 151 MG/DL
CHOLEST/HDLC SERPL: 4.3 RATIO
CO2 SERPL-SCNC: 25 MMOL/L
CREAT SERPL-MCNC: 0.9 MG/DL
EOSINOPHIL # BLD AUTO: 0.5 K/UL
EOSINOPHIL NFR BLD AUTO: 6.5 %
ESTIMATED AVERAGE GLUCOSE: 200 MG/DL
GLUCOSE SERPL-MCNC: 245 MG/DL
HBA1C MFR BLD HPLC: 8.6 %
HCT VFR BLD CALC: 42.6 %
HDLC SERPL-MCNC: 35 MG/DL
HGB BLD-MCNC: 14.3 G/DL
IMM GRANULOCYTES NFR BLD AUTO: 0.3 %
LDLC SERPL CALC-MCNC: 90 MG/DL
LYMPHOCYTES # BLD AUTO: 2.25 K/UL
LYMPHOCYTES NFR BLD AUTO: 29 %
MAN DIFF?: NORMAL
MCHC RBC-ENTMCNC: 29.7 PG
MCHC RBC-ENTMCNC: 33.6 G/DL
MCV RBC AUTO: 88.4 FL
MONOCYTES # BLD AUTO: 0.88 K/UL
MONOCYTES NFR BLD AUTO: 11.4 %
NEUTROPHILS # BLD AUTO: 3.97 K/UL
NEUTROPHILS NFR BLD AUTO: 51.1 %
PLATELET # BLD AUTO: 281 K/UL
POTASSIUM SERPL-SCNC: 4.7 MMOL/L
PROT SERPL-MCNC: 7 G/DL
RBC # BLD: 4.82 M/UL
RBC # FLD: 12.4 %
SODIUM SERPL-SCNC: 139 MMOL/L
TRIGL SERPL-MCNC: 174 MG/DL
WBC # FLD AUTO: 7.75 K/UL

## 2019-08-10 LAB
CREAT SPEC-SCNC: 141 MG/DL
MICROALBUMIN 24H UR DL<=1MG/L-MCNC: 1.4 MG/DL
MICROALBUMIN/CREAT 24H UR-RTO: 10 MG/G

## 2019-09-11 ENCOUNTER — APPOINTMENT (OUTPATIENT)
Dept: INTERNAL MEDICINE | Facility: HOSPITAL | Age: 59
End: 2019-09-11

## 2019-10-08 ENCOUNTER — APPOINTMENT (OUTPATIENT)
Dept: INTERNAL MEDICINE | Facility: HOSPITAL | Age: 59
End: 2019-10-08

## 2019-10-09 ENCOUNTER — OUTPATIENT (OUTPATIENT)
Dept: OUTPATIENT SERVICES | Facility: HOSPITAL | Age: 59
LOS: 1 days | Discharge: HOME | End: 2019-10-09

## 2019-10-09 ENCOUNTER — APPOINTMENT (OUTPATIENT)
Dept: INTERNAL MEDICINE | Facility: HOSPITAL | Age: 59
End: 2019-10-09
Payer: MEDICAID

## 2019-10-09 VITALS
HEIGHT: 66.25 IN | SYSTOLIC BLOOD PRESSURE: 114 MMHG | WEIGHT: 157 LBS | TEMPERATURE: 98.7 F | BODY MASS INDEX: 25.23 KG/M2 | RESPIRATION RATE: 12 BRPM | DIASTOLIC BLOOD PRESSURE: 72 MMHG | HEART RATE: 56 BPM

## 2019-10-09 PROCEDURE — 99213 OFFICE O/P EST LOW 20 MIN: CPT | Mod: 25

## 2019-10-09 NOTE — HISTORY OF PRESENT ILLNESS
[FreeTextEntry1] : Pt. is a 58yo male, here for follow up his NIDDM, hypercholesterolemia.  Pt. denies any polyuria/polydipsia/polyphagia, no CP, no SOB, no palpitation, no muscle weak/ache/pain.  Pt. had  muscle spasm of right shoulder which relieved by flexeril

## 2019-10-09 NOTE — PHYSICAL EXAM
[No Respiratory Distress] : no respiratory distress  [Regular Rhythm] : with a regular rhythm [No Accessory Muscle Use] : no accessory muscle use [Clear to Auscultation] : lungs were clear to auscultation bilaterally [Normal Rate] : normal rate  [Normal S1, S2] : normal S1 and S2 [No Abdominal Bruit] : a ~M bruit was not heard ~T in the abdomen [No Varicosities] : no varicosities [No Carotid Bruits] : no carotid bruits [No Edema] : there was no peripheral edema [Pedal Pulses Present] : the pedal pulses are present [No Palpable Aorta] : no palpable aorta [Soft] : abdomen soft [Non Tender] : non-tender [No Extremity Clubbing/Cyanosis] : no extremity clubbing/cyanosis [Non-distended] : non-distended [No Masses] : no abdominal mass palpated [No HSM] : no HSM [Normal Bowel Sounds] : normal bowel sounds [Normal Posterior Cervical Nodes] : no posterior cervical lymphadenopathy [Normal Anterior Cervical Nodes] : no anterior cervical lymphadenopathy [No Spinal Tenderness] : no spinal tenderness [No Joint Swelling] : no joint swelling [No CVA Tenderness] : no CVA  tenderness [Grossly Normal Strength/Tone] : grossly normal strength/tone [de-identified] : muscle spasm of trapezius muscle on the right, FRAM/FRPM

## 2019-10-09 NOTE — COUNSELING
[Risk of tobacco use and health benefits of smoking cessation discussed] : Risk of tobacco use and health benefits of smoking cessation discussed [Willing to Quit Smoking] : Willing to quit smoking [Cessation strategies including cessation program discussed] : Cessation strategies including cessation program discussed [FreeTextEntry2] : p

## 2019-10-09 NOTE — ASSESSMENT
[FreeTextEntry1] : HCM\par a.  flu vaccine given left deltoid\par b.  follow GI Dr. Esteban scheduled 10/21/19

## 2019-10-10 DIAGNOSIS — M62.838 OTHER MUSCLE SPASM: ICD-10-CM

## 2019-10-10 DIAGNOSIS — Z23 ENCOUNTER FOR IMMUNIZATION: ICD-10-CM

## 2019-10-10 DIAGNOSIS — I25.10 ATHEROSCLEROTIC HEART DISEASE OF NATIVE CORONARY ARTERY WITHOUT ANGINA PECTORIS: ICD-10-CM

## 2019-10-10 DIAGNOSIS — E78.00 PURE HYPERCHOLESTEROLEMIA, UNSPECIFIED: ICD-10-CM

## 2019-10-10 DIAGNOSIS — R80.9 PROTEINURIA, UNSPECIFIED: ICD-10-CM

## 2019-10-10 DIAGNOSIS — E11.29 TYPE 2 DIABETES MELLITUS WITH OTHER DIABETIC KIDNEY COMPLICATION: ICD-10-CM

## 2019-10-21 ENCOUNTER — RX RENEWAL (OUTPATIENT)
Age: 59
End: 2019-10-21

## 2019-11-05 ENCOUNTER — RX RENEWAL (OUTPATIENT)
Age: 59
End: 2019-11-05

## 2019-11-19 ENCOUNTER — RX RENEWAL (OUTPATIENT)
Age: 59
End: 2019-11-19

## 2020-01-13 ENCOUNTER — RX RENEWAL (OUTPATIENT)
Age: 60
End: 2020-01-13

## 2020-01-14 ENCOUNTER — APPOINTMENT (OUTPATIENT)
Dept: INTERNAL MEDICINE | Facility: HOSPITAL | Age: 60
End: 2020-01-14

## 2020-01-14 LAB
ALBUMIN SERPL ELPH-MCNC: 4.6 G/DL
ALP BLD-CCNC: 79 U/L
ALT SERPL-CCNC: 35 U/L
ANION GAP SERPL CALC-SCNC: 14 MMOL/L
AST SERPL-CCNC: 29 U/L
BASOPHILS # BLD AUTO: 0.11 K/UL
BASOPHILS NFR BLD AUTO: 1.4 %
BILIRUB SERPL-MCNC: <0.2 MG/DL
BUN SERPL-MCNC: 13 MG/DL
CALCIUM SERPL-MCNC: 9.9 MG/DL
CHLORIDE SERPL-SCNC: 100 MMOL/L
CHOLEST SERPL-MCNC: 106 MG/DL
CHOLEST/HDLC SERPL: 3.1 RATIO
CO2 SERPL-SCNC: 26 MMOL/L
CREAT SERPL-MCNC: 0.7 MG/DL
EOSINOPHIL # BLD AUTO: 0.41 K/UL
EOSINOPHIL NFR BLD AUTO: 5.3 %
ESTIMATED AVERAGE GLUCOSE: 163 MG/DL
GLUCOSE SERPL-MCNC: 116 MG/DL
HBA1C MFR BLD HPLC: 7.3 %
HCT VFR BLD CALC: 41.8 %
HDLC SERPL-MCNC: 34 MG/DL
HGB BLD-MCNC: 13.7 G/DL
IMM GRANULOCYTES NFR BLD AUTO: 0.3 %
LDLC SERPL CALC-MCNC: 49 MG/DL
LYMPHOCYTES # BLD AUTO: 2.02 K/UL
LYMPHOCYTES NFR BLD AUTO: 26.1 %
MAN DIFF?: NORMAL
MCHC RBC-ENTMCNC: 30 PG
MCHC RBC-ENTMCNC: 32.8 G/DL
MCV RBC AUTO: 91.7 FL
MONOCYTES # BLD AUTO: 0.98 K/UL
MONOCYTES NFR BLD AUTO: 12.7 %
NEUTROPHILS # BLD AUTO: 4.2 K/UL
NEUTROPHILS NFR BLD AUTO: 54.2 %
PLATELET # BLD AUTO: 261 K/UL
POTASSIUM SERPL-SCNC: 5.1 MMOL/L
PROT SERPL-MCNC: 6.9 G/DL
RBC # BLD: 4.56 M/UL
RBC # FLD: 13 %
SODIUM SERPL-SCNC: 140 MMOL/L
TRIGL SERPL-MCNC: 114 MG/DL
WBC # FLD AUTO: 7.74 K/UL

## 2020-01-21 ENCOUNTER — RX RENEWAL (OUTPATIENT)
Age: 60
End: 2020-01-21

## 2020-01-30 ENCOUNTER — OUTPATIENT (OUTPATIENT)
Dept: OUTPATIENT SERVICES | Facility: HOSPITAL | Age: 60
LOS: 1 days | Discharge: HOME | End: 2020-01-30

## 2020-01-30 ENCOUNTER — APPOINTMENT (OUTPATIENT)
Dept: INTERNAL MEDICINE | Facility: HOSPITAL | Age: 60
End: 2020-01-30
Payer: MEDICAID

## 2020-01-30 VITALS
WEIGHT: 150 LBS | HEIGHT: 66 IN | BODY MASS INDEX: 24.11 KG/M2 | RESPIRATION RATE: 16 BRPM | HEART RATE: 74 BPM | DIASTOLIC BLOOD PRESSURE: 62 MMHG | SYSTOLIC BLOOD PRESSURE: 118 MMHG | TEMPERATURE: 98.4 F

## 2020-01-30 DIAGNOSIS — E78.00 PURE HYPERCHOLESTEROLEMIA, UNSPECIFIED: ICD-10-CM

## 2020-01-30 DIAGNOSIS — F17.200 NICOTINE DEPENDENCE, UNSPECIFIED, UNCOMPLICATED: ICD-10-CM

## 2020-01-30 DIAGNOSIS — N52.9 MALE ERECTILE DYSFUNCTION, UNSPECIFIED: ICD-10-CM

## 2020-01-30 DIAGNOSIS — M62.830 MUSCLE SPASM OF BACK: ICD-10-CM

## 2020-01-30 DIAGNOSIS — E11.29 TYPE 2 DIABETES MELLITUS WITH OTHER DIABETIC KIDNEY COMPLICATION: ICD-10-CM

## 2020-01-30 DIAGNOSIS — E11.40 TYPE 2 DIABETES MELLITUS WITH DIABETIC NEUROPATHY, UNSPECIFIED: ICD-10-CM

## 2020-01-30 DIAGNOSIS — M62.838 OTHER MUSCLE SPASM: ICD-10-CM

## 2020-01-30 DIAGNOSIS — I25.10 ATHEROSCLEROTIC HEART DISEASE OF NATIVE CORONARY ARTERY WITHOUT ANGINA PECTORIS: ICD-10-CM

## 2020-01-30 PROCEDURE — 99213 OFFICE O/P EST LOW 20 MIN: CPT | Mod: GC

## 2020-01-30 RX ORDER — NICOTINE 21 MG/24HR
14 PATCH, TRANSDERMAL 24 HOURS TRANSDERMAL DAILY
Qty: 42 | Refills: 0 | Status: COMPLETED | COMMUNITY
Start: 2020-01-30 | End: 2020-03-12

## 2020-01-30 NOTE — PHYSICAL EXAM
[No Respiratory Distress] : no respiratory distress  [Clear to Auscultation] : lungs were clear to auscultation bilaterally [No Accessory Muscle Use] : no accessory muscle use [Normal Rate] : normal rate  [No Carotid Bruits] : no carotid bruits [Regular Rhythm] : with a regular rhythm [Normal S1, S2] : normal S1 and S2 [Pedal Pulses Present] : the pedal pulses are present [No Abdominal Bruit] : a ~M bruit was not heard ~T in the abdomen [No Varicosities] : no varicosities [No Edema] : there was no peripheral edema [No Extremity Clubbing/Cyanosis] : no extremity clubbing/cyanosis [No Palpable Aorta] : no palpable aorta [Non-distended] : non-distended [Non Tender] : non-tender [Soft] : abdomen soft [Normal Bowel Sounds] : normal bowel sounds [No HSM] : no HSM [No Masses] : no abdominal mass palpated [Grossly Normal Strength/Tone] : grossly normal strength/tone [No Joint Swelling] : no joint swelling [No CVA Tenderness] : no CVA  tenderness [No Spinal Tenderness] : no spinal tenderness [de-identified] : no suprapubic tenderness [de-identified] : tenderness on palpation right paraspinal muscle at T12, L1 level, paraspinal muscle spasm right side

## 2020-01-30 NOTE — HISTORY OF PRESENT ILLNESS
[FreeTextEntry1] : Pt. is a 60yo male, here for followup NIDDM, hypercholesterolemia.  Pt. c/o 4 days h/o pain right flank area, without any radiation of pain, no n/v, no fever.  Pt. reports heat/hot showers relieved his pain.  No trauma/injury to his back.  Pt. denies any polyuria/polydipsia/polyphagia, has neuropathic pain of b/l feet, some relieves with gabapentin.  Pt. has colonoscopy rescheduled to 2/6/20.  Pt. also c/o erectile dysfunction, has urge to have sex however has difficulty with erection

## 2020-01-30 NOTE — REVIEW OF SYSTEMS
[Back Pain] : back pain [Negative] : Psychiatric [Joint Pain] : no joint pain [Joint Stiffness] : no joint stiffness [Muscle Pain] : no muscle pain [Muscle Weakness] : no muscle weakness [Joint Swelling] : no joint swelling [FreeTextEntry9] : right sided back pain [de-identified] : tingling/numbness of b/l feet

## 2020-01-30 NOTE — ASSESSMENT
[FreeTextEntry1] : HCM\par a.  follow colonoscopy appointment 2/6/20\par b.  nicotine patch 14mg/24h to non hairy part of chest wall for 6 weeks

## 2020-02-27 ENCOUNTER — APPOINTMENT (OUTPATIENT)
Dept: PODIATRY | Facility: HOSPITAL | Age: 60
End: 2020-02-27

## 2020-04-02 ENCOUNTER — OUTPATIENT (OUTPATIENT)
Dept: OUTPATIENT SERVICES | Facility: HOSPITAL | Age: 60
LOS: 1 days | Discharge: HOME | End: 2020-04-02

## 2020-04-02 DIAGNOSIS — Z00.8 ENCOUNTER FOR OTHER GENERAL EXAMINATION: ICD-10-CM

## 2020-04-02 LAB
ALBUMIN SERPL ELPH-MCNC: 4.4 G/DL — SIGNIFICANT CHANGE UP (ref 3.5–5.2)
ALP SERPL-CCNC: 83 U/L — SIGNIFICANT CHANGE UP (ref 30–115)
ALT FLD-CCNC: 22 U/L — SIGNIFICANT CHANGE UP (ref 0–41)
ANION GAP SERPL CALC-SCNC: 13 MMOL/L — SIGNIFICANT CHANGE UP (ref 7–14)
APPEARANCE UR: CLEAR — SIGNIFICANT CHANGE UP
AST SERPL-CCNC: 19 U/L — SIGNIFICANT CHANGE UP (ref 0–41)
BILIRUB SERPL-MCNC: <0.2 MG/DL — SIGNIFICANT CHANGE UP (ref 0.2–1.2)
BILIRUB UR-MCNC: NEGATIVE — SIGNIFICANT CHANGE UP
BUN SERPL-MCNC: 19 MG/DL — SIGNIFICANT CHANGE UP (ref 10–20)
CALCIUM SERPL-MCNC: 9.3 MG/DL — SIGNIFICANT CHANGE UP (ref 8.5–10.1)
CHLORIDE SERPL-SCNC: 102 MMOL/L — SIGNIFICANT CHANGE UP (ref 98–110)
CHOLEST SERPL-MCNC: 90 MG/DL — LOW (ref 100–200)
CO2 SERPL-SCNC: 26 MMOL/L — SIGNIFICANT CHANGE UP (ref 17–32)
COLOR SPEC: YELLOW — SIGNIFICANT CHANGE UP
CREAT SERPL-MCNC: 0.9 MG/DL — SIGNIFICANT CHANGE UP (ref 0.7–1.5)
DIFF PNL FLD: NEGATIVE — SIGNIFICANT CHANGE UP
ESTIMATED AVERAGE GLUCOSE: 174 MG/DL — HIGH (ref 68–114)
GLUCOSE SERPL-MCNC: 152 MG/DL — HIGH (ref 70–99)
GLUCOSE UR QL: 500 MG/DL
HBA1C BLD-MCNC: 7.7 % — HIGH (ref 4–5.6)
HCT VFR BLD CALC: 42.6 % — SIGNIFICANT CHANGE UP (ref 42–52)
HDLC SERPL-MCNC: 38 MG/DL — LOW
HGB BLD-MCNC: 14 G/DL — SIGNIFICANT CHANGE UP (ref 14–18)
KETONES UR-MCNC: NEGATIVE — SIGNIFICANT CHANGE UP
LEUKOCYTE ESTERASE UR-ACNC: NEGATIVE — SIGNIFICANT CHANGE UP
LIPID PNL WITH DIRECT LDL SERPL: 34 MG/DL — SIGNIFICANT CHANGE UP (ref 4–129)
MAGNESIUM SERPL-MCNC: 1.9 MG/DL — SIGNIFICANT CHANGE UP (ref 1.8–2.4)
MCHC RBC-ENTMCNC: 30.2 PG — SIGNIFICANT CHANGE UP (ref 27–31)
MCHC RBC-ENTMCNC: 32.9 G/DL — SIGNIFICANT CHANGE UP (ref 32–37)
MCV RBC AUTO: 92 FL — SIGNIFICANT CHANGE UP (ref 80–94)
NITRITE UR-MCNC: NEGATIVE — SIGNIFICANT CHANGE UP
NRBC # BLD: 0 /100 WBCS — SIGNIFICANT CHANGE UP (ref 0–0)
PH UR: 6.5 — SIGNIFICANT CHANGE UP (ref 5–8)
PLATELET # BLD AUTO: 274 K/UL — SIGNIFICANT CHANGE UP (ref 130–400)
POTASSIUM SERPL-MCNC: 4.7 MMOL/L — SIGNIFICANT CHANGE UP (ref 3.5–5)
POTASSIUM SERPL-SCNC: 4.7 MMOL/L — SIGNIFICANT CHANGE UP (ref 3.5–5)
PROT SERPL-MCNC: 6.7 G/DL — SIGNIFICANT CHANGE UP (ref 6–8)
PROT UR-MCNC: NEGATIVE MG/DL — SIGNIFICANT CHANGE UP
RBC # BLD: 4.63 M/UL — LOW (ref 4.7–6.1)
RBC # FLD: 12.2 % — SIGNIFICANT CHANGE UP (ref 11.5–14.5)
SODIUM SERPL-SCNC: 141 MMOL/L — SIGNIFICANT CHANGE UP (ref 135–146)
SP GR SPEC: 1.02 — SIGNIFICANT CHANGE UP (ref 1.01–1.03)
TOTAL CHOLESTEROL/HDL RATIO MEASUREMENT: 2.4 RATIO — LOW (ref 4–5.5)
TRIGL SERPL-MCNC: 125 MG/DL — SIGNIFICANT CHANGE UP (ref 10–149)
UROBILINOGEN FLD QL: 0.2 MG/DL — SIGNIFICANT CHANGE UP (ref 0.2–0.2)
WBC # BLD: 8.31 K/UL — SIGNIFICANT CHANGE UP (ref 4.8–10.8)
WBC # FLD AUTO: 8.31 K/UL — SIGNIFICANT CHANGE UP (ref 4.8–10.8)

## 2020-04-03 LAB
HAV IGM SER-ACNC: SIGNIFICANT CHANGE UP
HBV CORE IGM SER-ACNC: SIGNIFICANT CHANGE UP
HBV SURFACE AG SER-ACNC: SIGNIFICANT CHANGE UP
HCV AB S/CO SERPL IA: 0.29 S/CO — SIGNIFICANT CHANGE UP (ref 0–0.99)
HCV AB SERPL-IMP: SIGNIFICANT CHANGE UP
T PALLIDUM AB TITR SER: NEGATIVE — SIGNIFICANT CHANGE UP

## 2020-04-04 LAB
GAMMA INTERFERON BACKGROUND BLD IA-ACNC: 0.01 IU/ML — SIGNIFICANT CHANGE UP
M TB IFN-G BLD-IMP: NEGATIVE — SIGNIFICANT CHANGE UP
M TB IFN-G CD4+ BCKGRND COR BLD-ACNC: 0.23 IU/ML — SIGNIFICANT CHANGE UP
M TB IFN-G CD4+CD8+ BCKGRND COR BLD-ACNC: 0.25 IU/ML — SIGNIFICANT CHANGE UP
QUANT TB PLUS MITOGEN MINUS NIL: 7.93 IU/ML — SIGNIFICANT CHANGE UP

## 2020-04-07 NOTE — PATIENT PROFILE ADULT - NSPROHMDIABETBLDGLCTARGET_GEN_A_NUR
----- Message from Salvatore Andrea sent at 4/7/2020 12:30 PM CDT -----  Contact: Pt   Pt would like to be called back regarding dizzy spells and would like to have prescription for problem sent to [id number] - [place], [place] - [facility]. [phone] (Phone) Pt has had symptoms for about a week.     Pt can be reached at [phone].    Thank You.   unknown

## 2020-04-30 ENCOUNTER — APPOINTMENT (OUTPATIENT)
Dept: INTERNAL MEDICINE | Facility: HOSPITAL | Age: 60
End: 2020-04-30

## 2020-05-06 ENCOUNTER — RX RENEWAL (OUTPATIENT)
Age: 60
End: 2020-05-06

## 2020-05-07 ENCOUNTER — RX RENEWAL (OUTPATIENT)
Age: 60
End: 2020-05-07

## 2020-05-28 ENCOUNTER — OUTPATIENT (OUTPATIENT)
Dept: OUTPATIENT SERVICES | Facility: HOSPITAL | Age: 60
LOS: 1 days | Discharge: HOME | End: 2020-05-28

## 2020-05-28 DIAGNOSIS — I49.9 CARDIAC ARRHYTHMIA, UNSPECIFIED: ICD-10-CM

## 2020-06-24 ENCOUNTER — RX RENEWAL (OUTPATIENT)
Age: 60
End: 2020-06-24

## 2020-07-06 ENCOUNTER — RX RENEWAL (OUTPATIENT)
Age: 60
End: 2020-07-06

## 2020-07-29 ENCOUNTER — APPOINTMENT (OUTPATIENT)
Dept: INTERNAL MEDICINE | Facility: CLINIC | Age: 60
End: 2020-07-29

## 2020-07-30 ENCOUNTER — RX RENEWAL (OUTPATIENT)
Age: 60
End: 2020-07-30

## 2020-08-02 ENCOUNTER — RX RENEWAL (OUTPATIENT)
Age: 60
End: 2020-08-02

## 2020-08-12 ENCOUNTER — APPOINTMENT (OUTPATIENT)
Dept: INTERNAL MEDICINE | Facility: CLINIC | Age: 60
End: 2020-08-12

## 2020-08-18 ENCOUNTER — RX RENEWAL (OUTPATIENT)
Age: 60
End: 2020-08-18

## 2020-09-16 ENCOUNTER — APPOINTMENT (OUTPATIENT)
Dept: INTERNAL MEDICINE | Facility: CLINIC | Age: 60
End: 2020-09-16

## 2020-09-17 ENCOUNTER — APPOINTMENT (OUTPATIENT)
Dept: INTERNAL MEDICINE | Facility: CLINIC | Age: 60
End: 2020-09-17
Payer: MEDICAID

## 2020-09-17 ENCOUNTER — OUTPATIENT (OUTPATIENT)
Dept: OUTPATIENT SERVICES | Facility: HOSPITAL | Age: 60
LOS: 1 days | Discharge: HOME | End: 2020-09-17
Payer: MEDICAID

## 2020-09-17 VITALS
HEART RATE: 66 BPM | HEIGHT: 66 IN | WEIGHT: 146 LBS | BODY MASS INDEX: 23.46 KG/M2 | TEMPERATURE: 97.7 F | SYSTOLIC BLOOD PRESSURE: 142 MMHG | DIASTOLIC BLOOD PRESSURE: 73 MMHG

## 2020-09-17 DIAGNOSIS — M62.830 MUSCLE SPASM OF BACK: ICD-10-CM

## 2020-09-17 PROCEDURE — 99214 OFFICE O/P EST MOD 30 MIN: CPT | Mod: 25,GC

## 2020-09-17 PROCEDURE — 93010 ELECTROCARDIOGRAM REPORT: CPT

## 2020-09-17 NOTE — END OF VISIT
[] : Resident [FreeTextEntry3] : I personally discussed this patient with the resident at the time of the visit.  And I was present with the resident during the key portions of the history and exam.  I agree with the assessment and plan as written, unless noted below.\par \par Flu vaccine given, EKG ordered, blood work/urine microalbumin ordered.  Pt. needs ophthal/podiatry/GI for colorectal cancer screening (has appointment with Dr. Gomez); follow up visit in 3 months\par

## 2020-09-17 NOTE — ASSESSMENT
[FreeTextEntry1] : Diabetic neuropathy (250.60,357.2) (E11.40)\par  · relieved by gabapentin\par     a. continue med at current dose. Pt as appt with pain management.\par Hypercholesteremia \par  · Recent labs reviewed, stable, on pravastatin, and zetia\par     a. continue pravastatin and zetia\par Type 2 diabetes mellitus with microalbuminuria \par  · HgA1C 7.7 on metformin, basaglar, enalapril and ASA\par     a. continue current management\par     b. Ophthal referral and podiatry referral\par     c. continue current diet: 2000 calories low sodium, low fat/chol. high fiber ADA diet\par     d. RTC 3 months\par Muscle spasm of right shoulder \par  · resolved\par Paraspinal muscle spasm \par  · a. warm compress to area\par     b. cyclobenzaprine 10mg at bedtime prn\par     c. no heavy lifting\par     d. Advil 400mg Q8h PRN with food\par CAD (coronary artery disease), native coronary artery \par  · on metoprolol, Brilinta\par     a. continue current plan\par \par HCM\par a. follow up colonoscopy/ GI appointment next month w/ Dr Gomez. \par c. labs reviewed - significant for HbA1c of 7.7. Repeat labs ordered for next visit. \par d. Repeat EKG today NSR

## 2020-09-17 NOTE — PHYSICAL EXAM
[No Acute Distress] : no acute distress [PERRL] : pupils equal round and reactive to light [EOMI] : extraocular movements intact [No Respiratory Distress] : no respiratory distress  [No Accessory Muscle Use] : no accessory muscle use [Clear to Auscultation] : lungs were clear to auscultation bilaterally [Normal Rate] : normal rate  [Regular Rhythm] : with a regular rhythm [Normal S1, S2] : normal S1 and S2 [No Murmur] : no murmur heard [No Edema] : there was no peripheral edema [Soft] : abdomen soft [Non Tender] : non-tender [Non-distended] : non-distended [Grossly Normal Strength/Tone] : grossly normal strength/tone [No Focal Deficits] : no focal deficits [Normal Affect] : the affect was normal [Alert and Oriented x3] : oriented to person, place, and time [Normal Mood] : the mood was normal [de-identified] : poor dentition [de-identified] : Grossly intact sensation. Mild decreased sensation over calves. SLR negative. low back ROM limited by pain and stiffness - particularly oblique extension and extension. Tenderness to paraspinal lumbar region and paracervical muscles.

## 2020-09-17 NOTE — HISTORY OF PRESENT ILLNESS
[FreeTextEntry1] : Follow-up [de-identified] : Pt. is a 58yo male, hx DM, hypercholesterolemia. Pt. c/o low back pain as well as neck pain. Notes worked as polo for many years. Takes Advil PRN and gabapentin and flexeril 5mg at bedtime as needed. Notes gabapentin and flexeril help. Notes that has GI appointment for colonoscopy w/ Dr Gomez coming up as well as new appt with pain management. Notes needs ophthal. and pod referrals. \par He notes he has not filled the sildenafil for ED from prior visit but will take it now.

## 2020-09-21 DIAGNOSIS — N52.9 MALE ERECTILE DYSFUNCTION, UNSPECIFIED: ICD-10-CM

## 2020-09-21 DIAGNOSIS — M62.830 MUSCLE SPASM OF BACK: ICD-10-CM

## 2020-09-21 DIAGNOSIS — E11.29 TYPE 2 DIABETES MELLITUS WITH OTHER DIABETIC KIDNEY COMPLICATION: ICD-10-CM

## 2020-09-21 DIAGNOSIS — Z00.00 ENCOUNTER FOR GENERAL ADULT MEDICAL EXAMINATION WITHOUT ABNORMAL FINDINGS: ICD-10-CM

## 2020-09-21 DIAGNOSIS — E11.43 TYPE 2 DIABETES MELLITUS WITH DIABETIC AUTONOMIC (POLY)NEUROPATHY: ICD-10-CM

## 2020-09-21 DIAGNOSIS — K59.00 CONSTIPATION, UNSPECIFIED: ICD-10-CM

## 2020-10-12 ENCOUNTER — RX RENEWAL (OUTPATIENT)
Age: 60
End: 2020-10-12

## 2020-10-14 ENCOUNTER — RX RENEWAL (OUTPATIENT)
Age: 60
End: 2020-10-14

## 2020-12-08 ENCOUNTER — APPOINTMENT (OUTPATIENT)
Dept: INTERNAL MEDICINE | Facility: CLINIC | Age: 60
End: 2020-12-08

## 2020-12-21 ENCOUNTER — RX RENEWAL (OUTPATIENT)
Age: 60
End: 2020-12-21

## 2020-12-21 RX ORDER — CYCLOBENZAPRINE HYDROCHLORIDE 10 MG/1
10 TABLET, FILM COATED ORAL
Qty: 30 | Refills: 0 | Status: DISCONTINUED | COMMUNITY
Start: 2019-08-01 | End: 2020-12-21

## 2021-01-12 ENCOUNTER — RX RENEWAL (OUTPATIENT)
Age: 61
End: 2021-01-12

## 2021-02-20 ENCOUNTER — RX RENEWAL (OUTPATIENT)
Age: 61
End: 2021-02-20

## 2021-03-15 ENCOUNTER — RX RENEWAL (OUTPATIENT)
Age: 61
End: 2021-03-15

## 2021-04-01 ENCOUNTER — NON-APPOINTMENT (OUTPATIENT)
Age: 61
End: 2021-04-01

## 2021-04-15 ENCOUNTER — APPOINTMENT (OUTPATIENT)
Dept: INTERNAL MEDICINE | Facility: CLINIC | Age: 61
End: 2021-04-15

## 2021-04-19 ENCOUNTER — RX RENEWAL (OUTPATIENT)
Age: 61
End: 2021-04-19

## 2021-05-03 ENCOUNTER — TRANSCRIPTION ENCOUNTER (OUTPATIENT)
Age: 61
End: 2021-05-03

## 2021-05-13 ENCOUNTER — APPOINTMENT (OUTPATIENT)
Dept: INTERNAL MEDICINE | Facility: CLINIC | Age: 61
End: 2021-05-13
Payer: MEDICAID

## 2021-05-13 ENCOUNTER — OUTPATIENT (OUTPATIENT)
Dept: OUTPATIENT SERVICES | Facility: HOSPITAL | Age: 61
LOS: 1 days | Discharge: HOME | End: 2021-05-13

## 2021-05-13 VITALS
HEIGHT: 66 IN | BODY MASS INDEX: 24.27 KG/M2 | OXYGEN SATURATION: 97 % | SYSTOLIC BLOOD PRESSURE: 154 MMHG | WEIGHT: 151 LBS | DIASTOLIC BLOOD PRESSURE: 79 MMHG | TEMPERATURE: 97.4 F | HEART RATE: 61 BPM

## 2021-05-13 PROCEDURE — 99214 OFFICE O/P EST MOD 30 MIN: CPT | Mod: GC

## 2021-05-13 RX ORDER — METHADONE HYDROCHLORIDE 10 MG/ML
10 CONCENTRATE ORAL
Refills: 0 | Status: DISCONTINUED | COMMUNITY
End: 2021-05-13

## 2021-05-13 NOTE — ASSESSMENT
[FreeTextEntry1] : 01.  CAD: on metoprolol, Brilinta\par a.  continue current meds.\par b.  follow Dr. Villa\par \par 02.  NIDDM with neuropathy: metformin, basaglar, enalapril, gabapentin\par a.  continue current meds\par b.  ophthal and podiatry referral\par c.  fasting CMP, CBC, lipid profile, A1C, Hep. C ab, Vitamin D, microalbumin/creatinine ratio\par d.  RTC 3 months\par e.  low salt, low fat/chol. high fiber ADA diet\par \par 03.  Hypercholesterolemia: on pravastatin and zetia\par a.  continue meds.\par \par 04.  HCM\par a.  reorder GI referral for colorectal cancer screening

## 2021-05-13 NOTE — HISTORY OF PRESENT ILLNESS
[FreeTextEntry1] : Pt. is a 60yo male with NIDDM with neuropathy, hypercholesterolemia, CAD, vitamin D deficiency; last seen 9/17/20.  Non compliant with follow up and blood work, and did not schedule GI/eye/podiatry appointment.  Pt. reports he moved to [place], and now back to [place] for the past 3 weeks.  Pt. denies any polyuria/polydipsia/polyphagia, no chest pain, no palpation, no SOB, no n/v/diarrhea.  Pt. states he is off methadone now

## 2021-05-18 DIAGNOSIS — I25.10 ATHEROSCLEROTIC HEART DISEASE OF NATIVE CORONARY ARTERY WITHOUT ANGINA PECTORIS: ICD-10-CM

## 2021-05-18 DIAGNOSIS — E78.00 PURE HYPERCHOLESTEROLEMIA, UNSPECIFIED: ICD-10-CM

## 2021-05-18 DIAGNOSIS — E11.29 TYPE 2 DIABETES MELLITUS WITH OTHER DIABETIC KIDNEY COMPLICATION: ICD-10-CM

## 2021-05-18 DIAGNOSIS — E11.40 TYPE 2 DIABETES MELLITUS WITH DIABETIC NEUROPATHY, UNSPECIFIED: ICD-10-CM

## 2021-05-18 DIAGNOSIS — Z00.00 ENCOUNTER FOR GENERAL ADULT MEDICAL EXAMINATION WITHOUT ABNORMAL FINDINGS: ICD-10-CM

## 2021-07-10 ENCOUNTER — TRANSCRIPTION ENCOUNTER (OUTPATIENT)
Age: 61
End: 2021-07-10

## 2021-07-16 ENCOUNTER — EMERGENCY (EMERGENCY)
Facility: HOSPITAL | Age: 61
LOS: 0 days | Discharge: HOME | End: 2021-07-16
Attending: EMERGENCY MEDICINE | Admitting: EMERGENCY MEDICINE
Payer: MEDICAID

## 2021-07-16 VITALS
RESPIRATION RATE: 16 BRPM | HEART RATE: 62 BPM | SYSTOLIC BLOOD PRESSURE: 191 MMHG | DIASTOLIC BLOOD PRESSURE: 86 MMHG | OXYGEN SATURATION: 98 % | TEMPERATURE: 96 F

## 2021-07-16 VITALS
HEART RATE: 91 BPM | RESPIRATION RATE: 20 BRPM | DIASTOLIC BLOOD PRESSURE: 89 MMHG | HEIGHT: 68 IN | SYSTOLIC BLOOD PRESSURE: 182 MMHG | OXYGEN SATURATION: 98 %

## 2021-07-16 DIAGNOSIS — R41.82 ALTERED MENTAL STATUS, UNSPECIFIED: ICD-10-CM

## 2021-07-16 DIAGNOSIS — F32.9 MAJOR DEPRESSIVE DISORDER, SINGLE EPISODE, UNSPECIFIED: ICD-10-CM

## 2021-07-16 DIAGNOSIS — R55 SYNCOPE AND COLLAPSE: ICD-10-CM

## 2021-07-16 DIAGNOSIS — S00.81XA ABRASION OF OTHER PART OF HEAD, INITIAL ENCOUNTER: ICD-10-CM

## 2021-07-16 DIAGNOSIS — S22.31XA FRACTURE OF ONE RIB, RIGHT SIDE, INITIAL ENCOUNTER FOR CLOSED FRACTURE: ICD-10-CM
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
## 2021-07-16 DIAGNOSIS — I25.10 ATHEROSCLEROTIC HEART DISEASE OF NATIVE CORONARY ARTERY WITHOUT ANGINA PECTORIS: ICD-10-CM
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
## 2021-07-16 DIAGNOSIS — I10 ESSENTIAL (PRIMARY) HYPERTENSION: ICD-10-CM

## 2021-07-16 DIAGNOSIS — E11.40 TYPE 2 DIABETES MELLITUS WITH DIABETIC NEUROPATHY, UNSPECIFIED: ICD-10-CM

## 2021-07-16 LAB
ALBUMIN SERPL ELPH-MCNC: 4.7 G/DL — SIGNIFICANT CHANGE UP (ref 3.5–5.2)
ALP SERPL-CCNC: 106 U/L — SIGNIFICANT CHANGE UP (ref 30–115)
ALT FLD-CCNC: 20 U/L — SIGNIFICANT CHANGE UP (ref 0–41)
ANION GAP SERPL CALC-SCNC: 12 MMOL/L — SIGNIFICANT CHANGE UP (ref 7–14)
APTT BLD: 27.9 SEC — SIGNIFICANT CHANGE UP (ref 27–39.2)
AST SERPL-CCNC: 22 U/L — SIGNIFICANT CHANGE UP (ref 0–41)
BASOPHILS # BLD AUTO: 0.09 K/UL — SIGNIFICANT CHANGE UP (ref 0–0.2)
BASOPHILS NFR BLD AUTO: 1.1 % — HIGH (ref 0–1)
BILIRUB SERPL-MCNC: 0.2 MG/DL — SIGNIFICANT CHANGE UP (ref 0.2–1.2)
BUN SERPL-MCNC: 19 MG/DL — SIGNIFICANT CHANGE UP (ref 10–20)
CALCIUM SERPL-MCNC: 9.8 MG/DL — SIGNIFICANT CHANGE UP (ref 8.5–10.1)
CHLORIDE SERPL-SCNC: 103 MMOL/L — SIGNIFICANT CHANGE UP (ref 98–110)
CO2 SERPL-SCNC: 25 MMOL/L — SIGNIFICANT CHANGE UP (ref 17–32)
CREAT SERPL-MCNC: 0.8 MG/DL — SIGNIFICANT CHANGE UP (ref 0.7–1.5)
EOSINOPHIL # BLD AUTO: 0.26 K/UL — SIGNIFICANT CHANGE UP (ref 0–0.7)
EOSINOPHIL NFR BLD AUTO: 3.3 % — SIGNIFICANT CHANGE UP (ref 0–8)
ETHANOL SERPL-MCNC: <10 MG/DL — SIGNIFICANT CHANGE UP
GLUCOSE SERPL-MCNC: 129 MG/DL — HIGH (ref 70–99)
HCT VFR BLD CALC: 37 % — LOW (ref 42–52)
HGB BLD-MCNC: 12.2 G/DL — LOW (ref 14–18)
IMM GRANULOCYTES NFR BLD AUTO: 0.3 % — SIGNIFICANT CHANGE UP (ref 0.1–0.3)
INR BLD: 1.08 RATIO — SIGNIFICANT CHANGE UP (ref 0.65–1.3)
LACTATE SERPL-SCNC: 0.9 MMOL/L — SIGNIFICANT CHANGE UP (ref 0.7–2)
LACTATE SERPL-SCNC: 2.9 MMOL/L — HIGH (ref 0.7–2)
LIDOCAIN IGE QN: 15 U/L — SIGNIFICANT CHANGE UP (ref 7–60)
LYMPHOCYTES # BLD AUTO: 2.12 K/UL — SIGNIFICANT CHANGE UP (ref 1.2–3.4)
LYMPHOCYTES # BLD AUTO: 26.6 % — SIGNIFICANT CHANGE UP (ref 20.5–51.1)
MCHC RBC-ENTMCNC: 29.5 PG — SIGNIFICANT CHANGE UP (ref 27–31)
MCHC RBC-ENTMCNC: 33 G/DL — SIGNIFICANT CHANGE UP (ref 32–37)
MCV RBC AUTO: 89.6 FL — SIGNIFICANT CHANGE UP (ref 80–94)
MONOCYTES # BLD AUTO: 0.89 K/UL — HIGH (ref 0.1–0.6)
MONOCYTES NFR BLD AUTO: 11.2 % — HIGH (ref 1.7–9.3)
NEUTROPHILS # BLD AUTO: 4.59 K/UL — SIGNIFICANT CHANGE UP (ref 1.4–6.5)
NEUTROPHILS NFR BLD AUTO: 57.5 % — SIGNIFICANT CHANGE UP (ref 42.2–75.2)
NRBC # BLD: 0 /100 WBCS — SIGNIFICANT CHANGE UP (ref 0–0)
PLATELET # BLD AUTO: 309 K/UL — SIGNIFICANT CHANGE UP (ref 130–400)
POTASSIUM SERPL-MCNC: 4.1 MMOL/L — SIGNIFICANT CHANGE UP (ref 3.5–5)
POTASSIUM SERPL-SCNC: 4.1 MMOL/L — SIGNIFICANT CHANGE UP (ref 3.5–5)
PROT SERPL-MCNC: 7.1 G/DL — SIGNIFICANT CHANGE UP (ref 6–8)
PROTHROM AB SERPL-ACNC: 12.4 SEC — SIGNIFICANT CHANGE UP (ref 9.95–12.87)
RBC # BLD: 4.13 M/UL — LOW (ref 4.7–6.1)
RBC # FLD: 12.1 % — SIGNIFICANT CHANGE UP (ref 11.5–14.5)
SODIUM SERPL-SCNC: 140 MMOL/L — SIGNIFICANT CHANGE UP (ref 135–146)
TROPONIN T SERPL-MCNC: <0.01 NG/ML — SIGNIFICANT CHANGE UP
WBC # BLD: 7.97 K/UL — SIGNIFICANT CHANGE UP (ref 4.8–10.8)
WBC # FLD AUTO: 7.97 K/UL — SIGNIFICANT CHANGE UP (ref 4.8–10.8)

## 2021-07-16 PROCEDURE — 71260 CT THORAX DX C+: CPT | Mod: 26,MA

## 2021-07-16 PROCEDURE — 99285 EMERGENCY DEPT VISIT HI MDM: CPT

## 2021-07-16 PROCEDURE — 70486 CT MAXILLOFACIAL W/O DYE: CPT | Mod: 26,MA

## 2021-07-16 PROCEDURE — 72125 CT NECK SPINE W/O DYE: CPT | Mod: 26,MA

## 2021-07-16 PROCEDURE — 70450 CT HEAD/BRAIN W/O DYE: CPT | Mod: 26,MA

## 2021-07-16 PROCEDURE — 74177 CT ABD & PELVIS W/CONTRAST: CPT | Mod: 26,MA

## 2021-07-16 PROCEDURE — 93010 ELECTROCARDIOGRAM REPORT: CPT

## 2021-07-16 PROCEDURE — 72170 X-RAY EXAM OF PELVIS: CPT | Mod: 26

## 2021-07-16 PROCEDURE — 71045 X-RAY EXAM CHEST 1 VIEW: CPT | Mod: 26

## 2021-07-16 RX ORDER — ONDANSETRON 8 MG/1
4 TABLET, FILM COATED ORAL ONCE
Refills: 0 | Status: COMPLETED | OUTPATIENT
Start: 2021-07-16 | End: 2021-07-16

## 2021-07-16 RX ORDER — LIDOCAINE 4 G/100G
10 CREAM TOPICAL ONCE
Refills: 0 | Status: COMPLETED | OUTPATIENT
Start: 2021-07-16 | End: 2021-07-16

## 2021-07-16 RX ORDER — LIDOCAINE HCL 20 MG/ML
4 VIAL (ML) INJECTION ONCE
Refills: 0 | Status: COMPLETED | OUTPATIENT
Start: 2021-07-16 | End: 2021-07-16

## 2021-07-16 RX ORDER — ROBINUL 0.2 MG/ML
0.2 INJECTION INTRAMUSCULAR; INTRAVENOUS ONCE
Refills: 0 | Status: COMPLETED | OUTPATIENT
Start: 2021-07-16 | End: 2021-07-16

## 2021-07-16 RX ORDER — SODIUM CHLORIDE 9 MG/ML
1000 INJECTION INTRAMUSCULAR; INTRAVENOUS; SUBCUTANEOUS ONCE
Refills: 0 | Status: COMPLETED | OUTPATIENT
Start: 2021-07-16 | End: 2021-07-16

## 2021-07-16 RX ADMIN — SODIUM CHLORIDE 1000 MILLILITER(S): 9 INJECTION INTRAMUSCULAR; INTRAVENOUS; SUBCUTANEOUS at 17:08

## 2021-07-16 RX ADMIN — ONDANSETRON 4 MILLIGRAM(S): 8 TABLET, FILM COATED ORAL at 15:57

## 2021-07-16 NOTE — ED PROVIDER NOTE - PATIENT PORTAL LINK FT
You can access the FollowMyHealth Patient Portal offered by [facility] by registering at the following website: [email or web address]. By joining [employer]’s FollowMyHealth portal, you will also be able to view your health information using other applications (apps) compatible with our system.

## 2021-07-16 NOTE — ED PROVIDER NOTE - PROGRESS NOTE DETAILS
ATTENDING NOTE: 60 y/o M with PMH of  DM, CAD, [place] ED [place] Pt is minimally responsive and was brought to ED crying and covered in sand. Pt is unable to provide any hx.    On exam: Pt crying, multiple abrasions on face. Head NCAT. Eyes- PERRL. Neck- no cervical spine tenderness. CN II-XII intact. Lungs CTABL. CV S1S2 regular. Abdomen soft NTND, (+) BS. Skin- covered in sand. Extremities (-) edema. Motor 5/5 x4, sensation intact.    Plan: EKG, CT, labs. ATTENDING NOTE: 62 y/o M with PMH of  DM, CAD, presents to ED after being [place] face [place] the [place]. Pt is awake, crying upset, denies any complains, states felt dizzy and passed out. Denies ETOH or drug abuse.    On exam: Pt crying, multiple abrasions on face. Head NCAT. Eyes- PERRL. Neck- no cervical spine tenderness. CN II-XII intact. MM dry. Lungs CTABL. CV S1S2 regular. Abdomen soft NTND, (+) BS. Skin- covered in sand, warm to touch. Extremities (-) edema. Motor 5/5 x4, sensation intact.    Plan: EKG, CT, labs. s/o from Dr. Sherwood.  follow up with CT, reassess mental status and dispo. TA: Spoke with patient and patient's wife. Aware of rib fracture. Will give incentive spirometer. and d/c with PMD follow up.

## 2021-07-16 NOTE — ED PROVIDER NOTE - OBJECTIVE STATEMENT
The patient is a 61 year old male presenting for The patient is a 61 year old male with a history of DM, CAD presenting after [place] down [place] the beach. In ED, patient is awake and crying. Denies any complaints. States he was working outside and felt dizzy so passed out. Denies ETOH/drug use.

## 2021-07-16 NOTE — ED ADULT TRIAGE NOTE - CHIEF COMPLAINT QUOTE
Patient found face down in sand as per EMS and was confused, Abrasion noted to right cheek and had no  other obvious signs of injury

## 2021-07-16 NOTE — ED PROVIDER NOTE - NSFOLLOWUPINSTRUCTIONS_ED_ALL_ED_FT
Fall Prevention in the Home    Falls can cause injuries. They can happen to people of all ages. There are many things you can do to make your home safe and to help prevent falls.     WHAT CAN I DO ON THE OUTSIDE OF MY HOME?  Regularly fix the edges of walkways and driveways and fix any cracks.  Remove anything that might make you trip as you walk through a door, such as a raised step or threshold.  Trim any bushes or trees on the path to your home.  Use bright outdoor lighting.  Clear any walking paths of anything that might make someone trip, such as rocks or tools.  Regularly check to see if handrails are loose or broken. Make sure that both sides of any steps have handrails.  Any raised decks and porches should have guardrails on the edges.  Have any leaves, snow, or ice cleared regularly.  Use sand or salt on walking paths during winter.  Clean up any spills in your garage right away. This includes oil or grease spills.    WHAT CAN I DO IN THE BATHROOM?  Use night lights.  Install grab bars by the toilet and in the tub and shower. Do not use towel bars as grab bars.  Use non-skid mats or decals in the tub or shower.  If you need to sit down in the shower, use a plastic, non-slip stool.  Keep the floor dry. Clean up any water that spills on the floor as soon as it happens.  Remove soap buildup in the tub or shower regularly.  Attach bath mats securely with double-sided non-slip rug tape.  Do not have throw rugs and other things on the floor that can make you trip.    WHAT CAN I DO IN THE BEDROOM?  Use night lights.  Make sure that you have a light by your bed that is easy to reach.  Do not use any sheets or blankets that are too big for your bed. They should not hang down onto the floor.  Have a firm chair that has side arms. You can use this for support while you get dressed.  Do not have throw rugs and other things on the floor that can make you trip.    WHAT CAN I DO IN THE KITCHEN?  Clean up any spills right away.  Avoid walking on wet floors.  Keep items that you use a lot in easy-to-reach places.  If you need to reach something above you, use a strong step stool that has a grab bar.  Keep electrical cords out of the way.  Do not use floor polish or wax that makes floors slippery. If you must use wax, use non-skid floor wax.  Do not have throw rugs and other things on the floor that can make you trip.    WHAT CAN I DO WITH MY STAIRS?  Do not leave any items on the stairs.  Make sure that there are handrails on both sides of the stairs and use them. Fix handrails that are broken or loose. Make sure that handrails are as long as the stairways.  Check any carpeting to make sure that it is firmly attached to the stairs. Fix any carpet that is loose or worn.  Avoid having throw rugs at the top or bottom of the stairs. If you do have throw rugs, attach them to the floor with carpet tape.  Make sure that you have a light switch at the top of the stairs and the bottom of the stairs. If you do not have them, ask someone to add them for you.    WHAT ELSE CAN I DO TO HELP PREVENT FALLS?  Wear shoes that:  Do not have high heels.  Have rubber bottoms.  Are comfortable and fit you well.  Are closed at the toe. Do not wear sandals.  If you use a stepladder:  Make sure that it is fully opened. Do not climb a closed stepladder.  Make sure that both sides of the stepladder are locked into place.  Ask someone to hold it for you, if possible.  Clearly chino and make sure that you can see:  Any grab bars or handrails.  First and last steps.  Where the edge of each step is.  Use tools that help you move around (mobility aids) if they are needed. These include:  Canes.  Walkers.  Scooters.  Crutches.  Turn on the lights when you go into a dark area. Replace any light bulbs as soon as they burn out.  Set up your furniture so you have a clear path. Avoid moving your furniture around.  If any of your floors are uneven, fix them.  If there are any pets around you, be aware of where they are.  Review your medicines with your doctor. Some medicines can make you feel dizzy. This can increase your chance of falling.    Ask your doctor what other things that you can do to help prevent falls.    ADDITIONAL NOTES AND INSTRUCTIONS    Please follow up with your Primary MD in 24-48 hr.  Seek immediate medical care for any new/worsening signs or symptoms.

## 2021-07-16 NOTE — ED PROVIDER NOTE - PHYSICAL EXAMINATION
CONSTITUTIONAL: laying in stretcher; poor hygiene; crying  SKIN: sand on face.   HEAD: Normocephalic; atraumatic.  EYES: normal sclera and conjunctiva   ENT: No nasal discharge; airway clear.  NECK: Supple; non tender.  CARD: S1, S2 normal; no murmurs, gallops, or rubs. Regular rate and rhythm.   RESP: No wheezes, rales or rhonchi.  ABD: soft ntnd  EXT: Normal ROM.  No clubbing, cyanosis or edema.   LYMPH: No acute cervical adenopathy.  NEURO: Alert, oriented, grossly unremarkable  PSYCH: Cooperative, appropriate.

## 2021-07-16 NOTE — ED PROVIDER NOTE - CARE PROVIDER_API CALL
Pio Lehman)  Internal Medicine  [address], [address]  [place], [place] [id number]  Phone: [phone]  Fax: [phone]  Established Patient  Follow Up Time: Urgent

## 2021-07-29 ENCOUNTER — APPOINTMENT (OUTPATIENT)
Dept: INTERNAL MEDICINE | Facility: CLINIC | Age: 61
End: 2021-07-29
Payer: MEDICAID

## 2021-07-29 ENCOUNTER — NON-APPOINTMENT (OUTPATIENT)
Age: 61
End: 2021-07-29

## 2021-07-29 ENCOUNTER — OUTPATIENT (OUTPATIENT)
Dept: OUTPATIENT SERVICES | Facility: HOSPITAL | Age: 61
LOS: 1 days | Discharge: HOME | End: 2021-07-29

## 2021-07-29 VITALS
BODY MASS INDEX: 22.02 KG/M2 | HEART RATE: 61 BPM | SYSTOLIC BLOOD PRESSURE: 164 MMHG | TEMPERATURE: 97.8 F | DIASTOLIC BLOOD PRESSURE: 74 MMHG | OXYGEN SATURATION: 97 % | HEIGHT: 66 IN | WEIGHT: 137 LBS

## 2021-07-29 DIAGNOSIS — S22.31XA FRACTURE OF ONE RIB, RIGHT SIDE, INITIAL ENCOUNTER FOR CLOSED FRACTURE: ICD-10-CM

## 2021-07-29 DIAGNOSIS — R55 SYNCOPE AND COLLAPSE: ICD-10-CM

## 2021-07-29 PROCEDURE — 99214 OFFICE O/P EST MOD 30 MIN: CPT | Mod: GC

## 2021-07-29 NOTE — HISTORY OF PRESENT ILLNESS
[FreeTextEntry1] : Pt. is a 62yo male with CAD, NIDDM with microalbuminuria/neuropathy, hypercholesterolemia, depression, vitamin D deficiency.  Pt. here for follow up, did not complete blood work ordered 5/13/21.  Has eye appointment 8/4/21, GI appointment 8/10/21, cardiology Dr. Villa 8/11/21; but did not scheduled podiatry appointment.  Here c/o worsening of diabetic neuropathic pain despite on neurontin 600mg twice daily.  Pt. denies any chest pain, SOB, palpitation, n/v/diarrhea, headache.  Pt. was seen in ER on 7/16/21 for syncope, with abd/pelvic/chest showed acute right 5th posterior rib fracture, and 3mm pulmonary nodule right lower lobe, and needs repeat chest CT in 1 year for follow up.  Here for follow up, no recurrent syncope.  Pt. denies any rib pain, no cough

## 2021-07-29 NOTE — ASSESSMENT
[FreeTextEntry1] : 01.  Syncope/CAD: no recurrent episode, pt. states he passed out while working in a hot day, ? dehydration\par a.  follow Dr. Villa as scheduled 8/11/21\par b.  neuro referral\par c.  encourage pt. well hydrate given h/o diabetes\par d.  continue metoprolol and Brilinta\par \par 02.  Pulmonary nodule right lower lobe 3mm: no cough\par a.  repeat chest CT in 1 year\par \par 03.  Right 5th rib fracture: pt. has  no rib pain, even with deep inspiration\par a.  observe\par \par 04.  NIDDM with microalbuminuria/neuropathy: on basaglar, metformin and enalapril\par a.  continue current meds\par b.  low salt, low fat/chol. high fiber ADA diet\par c.  follow ophthal appointment as scheduled 8/4/21\par d.  pt. did not schedule podiatry appointment, pt. states he will schedule podiatry appointment\par e.  complete blood work ordered\par f.  increase neurontin to 600mg 3X daily\par g.  neuro referral for diabetic neuropathy\par h.  RTC 3 months\par \par 05.  Hypercholesterolemia: on zetia and pravastatin\par a.  continue med\par \par 06.  HCM\par a.  pt. reports that he received 2 doses of Covid vaccine

## 2021-07-30 DIAGNOSIS — E11.40 TYPE 2 DIABETES MELLITUS WITH DIABETIC NEUROPATHY, UNSPECIFIED: ICD-10-CM

## 2021-07-30 DIAGNOSIS — E11.29 TYPE 2 DIABETES MELLITUS WITH OTHER DIABETIC KIDNEY COMPLICATION: ICD-10-CM

## 2021-07-30 DIAGNOSIS — E78.00 PURE HYPERCHOLESTEROLEMIA, UNSPECIFIED: ICD-10-CM

## 2021-07-30 DIAGNOSIS — S22.31XA FRACTURE OF ONE RIB, RIGHT SIDE, INITIAL ENCOUNTER FOR CLOSED FRACTURE: ICD-10-CM

## 2021-07-30 DIAGNOSIS — R55 SYNCOPE AND COLLAPSE: ICD-10-CM

## 2021-07-30 DIAGNOSIS — R91.1 SOLITARY PULMONARY NODULE: ICD-10-CM

## 2021-07-30 DIAGNOSIS — I25.10 ATHEROSCLEROTIC HEART DISEASE OF NATIVE CORONARY ARTERY WITHOUT ANGINA PECTORIS: ICD-10-CM

## 2021-08-04 ENCOUNTER — APPOINTMENT (OUTPATIENT)
Dept: OPHTHALMOLOGY | Facility: CLINIC | Age: 61
End: 2021-08-04

## 2021-08-04 ENCOUNTER — OUTPATIENT (OUTPATIENT)
Dept: OUTPATIENT SERVICES | Facility: HOSPITAL | Age: 61
LOS: 1 days | Discharge: HOME | End: 2021-08-04
Payer: MEDICAID

## 2021-08-04 PROCEDURE — 92014 COMPRE OPH EXAM EST PT 1/>: CPT

## 2021-08-10 ENCOUNTER — NON-APPOINTMENT (OUTPATIENT)
Age: 61
End: 2021-08-10

## 2021-08-10 ENCOUNTER — OUTPATIENT (OUTPATIENT)
Dept: OUTPATIENT SERVICES | Facility: HOSPITAL | Age: 61
LOS: 1 days | Discharge: HOME | End: 2021-08-10

## 2021-08-10 ENCOUNTER — APPOINTMENT (OUTPATIENT)
Dept: GASTROENTEROLOGY | Facility: CLINIC | Age: 61
End: 2021-08-10
Payer: MEDICAID

## 2021-08-10 VITALS
HEIGHT: 66 IN | WEIGHT: 137 LBS | SYSTOLIC BLOOD PRESSURE: 125 MMHG | HEART RATE: 97 BPM | BODY MASS INDEX: 22.02 KG/M2 | DIASTOLIC BLOOD PRESSURE: 65 MMHG | OXYGEN SATURATION: 98 % | TEMPERATURE: 98 F

## 2021-08-10 DIAGNOSIS — K59.00 CONSTIPATION, UNSPECIFIED: ICD-10-CM

## 2021-08-10 DIAGNOSIS — Z12.11 ENCOUNTER FOR SCREENING FOR MALIGNANT NEOPLASM OF COLON: ICD-10-CM

## 2021-08-10 PROCEDURE — 99203 OFFICE O/P NEW LOW 30 MIN: CPT | Mod: GC

## 2021-08-10 NOTE — PHYSICAL EXAM
[General Appearance - Alert] : alert [General Appearance - In No Acute Distress] : in no acute distress [Sclera] : the sclera and conjunctiva were normal [Outer Ear] : the ears and nose were normal in appearance [Neck Appearance] : the appearance of the neck was normal [] : no respiratory distress [Apical Impulse] : the apical impulse was normal [Abdomen Tenderness] : non-tender [Abnormal Walk] : normal gait [Skin Color & Pigmentation] : normal skin color and pigmentation [No Focal Deficits] : no focal deficits [Oriented To Time, Place, And Person] : oriented to person, place, and time

## 2021-08-11 LAB
25(OH)D3 SERPL-MCNC: 45 NG/ML
ALBUMIN SERPL ELPH-MCNC: 4.3 G/DL
ALP BLD-CCNC: 95 U/L
ALT SERPL-CCNC: 19 U/L
ANION GAP SERPL CALC-SCNC: 14 MMOL/L
AST SERPL-CCNC: 20 U/L
BASOPHILS # BLD AUTO: 0.11 K/UL
BASOPHILS NFR BLD AUTO: 1.3 %
BILIRUB SERPL-MCNC: <0.2 MG/DL
BUN SERPL-MCNC: 21 MG/DL
CALCIUM SERPL-MCNC: 9.6 MG/DL
CHLORIDE SERPL-SCNC: 102 MMOL/L
CHOLEST SERPL-MCNC: 130 MG/DL
CO2 SERPL-SCNC: 23 MMOL/L
CREAT SERPL-MCNC: 1 MG/DL
CREAT SPEC-SCNC: 197 MG/DL
EOSINOPHIL # BLD AUTO: 0.36 K/UL
EOSINOPHIL NFR BLD AUTO: 4.4 %
ESTIMATED AVERAGE GLUCOSE: 237 MG/DL
GLUCOSE SERPL-MCNC: 92 MG/DL
HBA1C MFR BLD HPLC: 9.9 %
HCT VFR BLD CALC: 36.6 %
HCV AB SER QL: NONREACTIVE
HCV S/CO RATIO: 0.14 S/CO
HDLC SERPL-MCNC: 54 MG/DL
HGB BLD-MCNC: 11.7 G/DL
IMM GRANULOCYTES NFR BLD AUTO: 0.6 %
LDLC SERPL CALC-MCNC: 55 MG/DL
LYMPHOCYTES # BLD AUTO: 1.93 K/UL
LYMPHOCYTES NFR BLD AUTO: 23.6 %
MAN DIFF?: NORMAL
MCHC RBC-ENTMCNC: 29.5 PG
MCHC RBC-ENTMCNC: 32 G/DL
MCV RBC AUTO: 92.2 FL
MICROALBUMIN 24H UR DL<=1MG/L-MCNC: 3.6 MG/DL
MICROALBUMIN/CREAT 24H UR-RTO: 18 MG/G
MONOCYTES # BLD AUTO: 0.84 K/UL
MONOCYTES NFR BLD AUTO: 10.3 %
NEUTROPHILS # BLD AUTO: 4.88 K/UL
NEUTROPHILS NFR BLD AUTO: 59.8 %
NONHDLC SERPL-MCNC: 76 MG/DL
PLATELET # BLD AUTO: 306 K/UL
POTASSIUM SERPL-SCNC: 4.4 MMOL/L
PROT SERPL-MCNC: 6.6 G/DL
RBC # BLD: 3.97 M/UL
RBC # FLD: 12.4 %
SODIUM SERPL-SCNC: 139 MMOL/L
TRIGL SERPL-MCNC: 80 MG/DL
WBC # FLD AUTO: 8.17 K/UL

## 2021-08-19 ENCOUNTER — APPOINTMENT (OUTPATIENT)
Dept: INTERNAL MEDICINE | Facility: CLINIC | Age: 61
End: 2021-08-19

## 2021-08-26 NOTE — ASSESSMENT
[FreeTextEntry1] : Patient is a 60 y/o with PMHx of HTN, CAD ( On DAPT for prior PCI) , DM whom presents for CRC screen. He notes he feels well with no abdominal pain, diarrhea, change in bowel movements, or appetite. Will setup for CRC screen. Patient going to Cardiology tomorrow for check up. He is to ask if he may hold Brillinta 3 days.\par \par Colon Cancer Screening\par - Follows Cardiology and has appointment 8/11- for CRC he can continue ASA but preferably hold Brillinta 3 days prior \par - Risk and benefit explained in detail to the patient\par - Clear liquid diet the day before the procedure\par - Finish all of the prescribed prep as ordered to insure good visualization\par - Please refrain from any NSAID use including ASA one week before\par - Follow up 3-4 weeks after the procedure stressed to insure follow up on pathology and planning for future screenings

## 2021-08-26 NOTE — HISTORY OF PRESENT ILLNESS
[de-identified] : Patient is a 62 y/o with PMHx of HTN, CAD ( On DAPT for prior PCI) , DM whom presents for CRC screen. He notes he feels well with no abdominal pain, diarrhea, change in bowel movements, or appetite.

## 2021-09-07 ENCOUNTER — APPOINTMENT (OUTPATIENT)
Dept: GASTROENTEROLOGY | Facility: CLINIC | Age: 61
End: 2021-09-07

## 2021-09-15 ENCOUNTER — NON-APPOINTMENT (OUTPATIENT)
Age: 61
End: 2021-09-15

## 2021-11-13 NOTE — ED PROVIDER NOTE - CHIEF COMPLAINT
RN spoke with medicine resident regarding bladder scan results. MD states that he will place orders for a montero catheter. The patient is a 61y Male complaining of altered mental status.

## 2021-11-22 ENCOUNTER — RX RENEWAL (OUTPATIENT)
Age: 61
End: 2021-11-22

## 2021-12-09 ENCOUNTER — APPOINTMENT (OUTPATIENT)
Dept: OPHTHALMOLOGY | Facility: CLINIC | Age: 61
End: 2021-12-09

## 2022-01-22 LAB
25(OH)D3 SERPL-MCNC: 36 NG/ML
ALBUMIN SERPL ELPH-MCNC: 4.3 G/DL
ALP BLD-CCNC: 102 U/L
ALT SERPL-CCNC: 15 U/L
ANION GAP SERPL CALC-SCNC: 14 MMOL/L
APPEARANCE: CLEAR
AST SERPL-CCNC: 15 U/L
BASOPHILS # BLD AUTO: 0.09 K/UL
BASOPHILS NFR BLD AUTO: 1.4 %
BILIRUB SERPL-MCNC: <0.2 MG/DL
BILIRUBIN URINE: NEGATIVE
BLOOD URINE: NEGATIVE
BUN SERPL-MCNC: 14 MG/DL
CALCIUM SERPL-MCNC: 9.4 MG/DL
CHLORIDE SERPL-SCNC: 98 MMOL/L
CHOLEST SERPL-MCNC: 128 MG/DL
CO2 SERPL-SCNC: 26 MMOL/L
COLOR: YELLOW
CREAT SERPL-MCNC: 0.9 MG/DL
CREAT SPEC-SCNC: 91 MG/DL
EOSINOPHIL # BLD AUTO: 0.36 K/UL
EOSINOPHIL NFR BLD AUTO: 5.7 %
ESTIMATED AVERAGE GLUCOSE: 209 MG/DL
GLUCOSE QUALITATIVE U: ABNORMAL
GLUCOSE SERPL-MCNC: 235 MG/DL
HBA1C MFR BLD HPLC: 8.9 %
HCT VFR BLD CALC: 43.3 %
HDLC SERPL-MCNC: 55 MG/DL
HGB BLD-MCNC: 13.8 G/DL
IMM GRANULOCYTES NFR BLD AUTO: 0.2 %
KETONES URINE: NEGATIVE
LDLC SERPL CALC-MCNC: 52 MG/DL
LEUKOCYTE ESTERASE URINE: NEGATIVE
LYMPHOCYTES # BLD AUTO: 1.28 K/UL
LYMPHOCYTES NFR BLD AUTO: 20.3 %
MAN DIFF?: NORMAL
MCHC RBC-ENTMCNC: 29.1 PG
MCHC RBC-ENTMCNC: 31.9 G/DL
MCV RBC AUTO: 91.2 FL
MICROALBUMIN 24H UR DL<=1MG/L-MCNC: 2.2 MG/DL
MICROALBUMIN/CREAT 24H UR-RTO: 24 MG/G
MONOCYTES # BLD AUTO: 0.64 K/UL
MONOCYTES NFR BLD AUTO: 10.1 %
NEUTROPHILS # BLD AUTO: 3.93 K/UL
NEUTROPHILS NFR BLD AUTO: 62.3 %
NITRITE URINE: NEGATIVE
NONHDLC SERPL-MCNC: 73 MG/DL
PH URINE: 6
PLATELET # BLD AUTO: 254 K/UL
POTASSIUM SERPL-SCNC: 4.5 MMOL/L
PROT SERPL-MCNC: 6.8 G/DL
PROTEIN URINE: NORMAL
RBC # BLD: 4.75 M/UL
RBC # FLD: 12.8 %
SODIUM SERPL-SCNC: 138 MMOL/L
SPECIFIC GRAVITY URINE: 1.02
TRIGL SERPL-MCNC: 139 MG/DL
TSH SERPL-ACNC: 3.05 UIU/ML
UROBILINOGEN URINE: NORMAL
WBC # FLD AUTO: 6.31 K/UL

## 2022-01-24 ENCOUNTER — NON-APPOINTMENT (OUTPATIENT)
Age: 62
End: 2022-01-24

## 2022-02-07 ENCOUNTER — APPOINTMENT (OUTPATIENT)
Dept: INTERNAL MEDICINE | Facility: CLINIC | Age: 62
End: 2022-02-07

## 2022-08-22 ENCOUNTER — OUTPATIENT (OUTPATIENT)
Dept: OUTPATIENT SERVICES | Facility: HOSPITAL | Age: 62
LOS: 1 days | Discharge: HOME | End: 2022-08-22

## 2022-08-22 ENCOUNTER — APPOINTMENT (OUTPATIENT)
Dept: INTERNAL MEDICINE | Facility: CLINIC | Age: 62
End: 2022-08-22

## 2022-08-22 VITALS
WEIGHT: 131 LBS | TEMPERATURE: 97.9 F | HEART RATE: 60 BPM | OXYGEN SATURATION: 99 % | SYSTOLIC BLOOD PRESSURE: 153 MMHG | DIASTOLIC BLOOD PRESSURE: 71 MMHG | BODY MASS INDEX: 21.05 KG/M2 | HEIGHT: 66 IN

## 2022-08-22 DIAGNOSIS — Z12.11 ENCOUNTER FOR SCREENING FOR MALIGNANT NEOPLASM OF COLON: ICD-10-CM

## 2022-08-22 DIAGNOSIS — Z86.39 PERSONAL HISTORY OF OTHER ENDOCRINE, NUTRITIONAL AND METABOLIC DISEASE: ICD-10-CM

## 2022-08-22 DIAGNOSIS — Z00.00 ENCOUNTER FOR GENERAL ADULT MEDICAL EXAMINATION W/OUT ABNORMAL FINDINGS: ICD-10-CM

## 2022-08-22 DIAGNOSIS — Z72.0 TOBACCO USE: ICD-10-CM

## 2022-08-22 PROCEDURE — 99214 OFFICE O/P EST MOD 30 MIN: CPT | Mod: GC

## 2022-08-22 RX ORDER — PEN NEEDLE, DIABETIC 29 G X1/2"
31G X 8 MM NEEDLE, DISPOSABLE MISCELLANEOUS
Qty: 100 | Refills: 2 | Status: ACTIVE | COMMUNITY
Start: 2019-04-02 | End: 1900-01-01

## 2022-08-22 RX ORDER — BLOOD-GLUCOSE METER
70 EACH MISCELLANEOUS
Qty: 1 | Refills: 0 | Status: ACTIVE | COMMUNITY
Start: 2018-11-08 | End: 1900-01-01

## 2022-08-22 RX ORDER — POLYETHYLENE GLYCOL 3350 AND ELECTROLYTES WITH LEMON FLAVOR 236; 22.74; 6.74; 5.86; 2.97 G/4L; G/4L; G/4L; G/4L; G/4L
236 POWDER, FOR SOLUTION ORAL
Qty: 1 | Refills: 0 | Status: DISCONTINUED | COMMUNITY
Start: 2021-08-10 | End: 2022-08-22

## 2022-08-22 NOTE — END OF VISIT
[] : Resident [FreeTextEntry3] : Pt. last seen 7/29/21, was in rehab in [place].  Pt. ran out of medication for the past 2 months.  /71, asymptomatic.  Medication renewed, and advised pt. to resume all medication ASAP.

## 2022-08-22 NOTE — PHYSICAL EXAM
[PERRL] : pupils equal round and reactive to light [EOMI] : extraocular movements intact [Normal Outer Ear/Nose] : the outer ears and nose were normal in appearance [Normal Oropharynx] : the oropharynx was normal [Normal Supraclavicular Nodes] : no supraclavicular lymphadenopathy [Normal Posterior Cervical Nodes] : no posterior cervical lymphadenopathy [Normal Anterior Cervical Nodes] : no anterior cervical lymphadenopathy [Coordination Grossly Intact] : coordination grossly intact [No Focal Deficits] : no focal deficits [Alert and Oriented x3] : oriented to person, place, and time [Normal] : affect was normal and insight and judgment were intact

## 2022-08-22 NOTE — HISTORY OF PRESENT ILLNESS
[FreeTextEntry1] : Pt is a 61 yo male with CAD, NIDDM with microalbuminuria/neuropathy, hypercholesterolemia, depression, vitamin D deficiency. Here for follow-up , last visit was 1 year ago. Pt. denies any chest pain, SOB, palpitation, n/v/diarrhea, headache. No issues today, reports he is just here for routine physical exam. Endorses active tobacco use 1.5 pack per day. Has run out of medications besides the Brilinta and metoprolol. Did not have colonoscopy because he was in [place] for awhile.

## 2022-08-22 NOTE — ASSESSMENT
[FreeTextEntry1] : 01. CAD / HLD / Active Tobacco use\par a.  follow Dr. Villa \par b.  continue metoprolol and Brilinta\par c. continue Pravastatin and Zetia - repeat lipid profile\par d. advised to quit smoking offered alternative cessation options but patient declined- currently smoking 1.5 packs per day\par \par 02.  Pulmonary nodule right lower lobe 3mm: no cough\par a.  repeat chest CT this year - ordered\par \par 03. Remote history of right 5th rib fracture (2021)\par a.  pt has no rib pain, even with deep inspiration - monitor\par \par 04.  NIDDM with microalbuminuria/neuropathy: on basaglar, metformin and enalapril\par a.  continue current meds\par b.  low salt, low fat/chol. high fiber ADA diet\par c.  follow up with opthalmology \par d.  pt. did not schedule podiatry appointment, pt. states he will schedule podiatry appointment\par e.  c/w neurontin to 600mg 3X daily\par f.  neuro referral given last visit for diabetic neuropathy, advised to follow up \par h.  RTC 3 months\par \par # HCM\par a. immunizations:  pt s/p 2 doses of Covid vaccine, TDAP up to date (2015)\par b. colonoscopy: due last year but didn't go -> instructed to follow up with GI\par \par RTC in 3 months. Repeat labs including A1c and lipid profile. Renewed meds. Follow up with GI, cardiology podiatry, opthalmology

## 2022-08-24 DIAGNOSIS — Z72.0 TOBACCO USE: ICD-10-CM

## 2022-08-24 DIAGNOSIS — Z12.11 ENCOUNTER FOR SCREENING FOR MALIGNANT NEOPLASM OF COLON: ICD-10-CM

## 2022-08-24 DIAGNOSIS — Z00.00 ENCOUNTER FOR GENERAL ADULT MEDICAL EXAMINATION WITHOUT ABNORMAL FINDINGS: ICD-10-CM

## 2022-08-24 DIAGNOSIS — Z86.39 PERSONAL HISTORY OF OTHER ENDOCRINE, NUTRITIONAL AND METABOLIC DISEASE: ICD-10-CM

## 2022-08-24 DIAGNOSIS — E78.00 PURE HYPERCHOLESTEROLEMIA, UNSPECIFIED: ICD-10-CM

## 2022-08-24 DIAGNOSIS — E11.29 TYPE 2 DIABETES MELLITUS WITH OTHER DIABETIC KIDNEY COMPLICATION: ICD-10-CM

## 2022-08-24 DIAGNOSIS — I25.10 ATHEROSCLEROTIC HEART DISEASE OF NATIVE CORONARY ARTERY WITHOUT ANGINA PECTORIS: ICD-10-CM

## 2022-08-24 DIAGNOSIS — R91.1 SOLITARY PULMONARY NODULE: ICD-10-CM

## 2022-08-29 ENCOUNTER — APPOINTMENT (OUTPATIENT)
Dept: PODIATRY | Facility: CLINIC | Age: 62
End: 2022-08-29

## 2022-08-29 ENCOUNTER — OUTPATIENT (OUTPATIENT)
Dept: OUTPATIENT SERVICES | Facility: HOSPITAL | Age: 62
LOS: 1 days | Discharge: HOME | End: 2022-08-29

## 2022-08-29 DIAGNOSIS — L84 CORNS AND CALLOSITIES: ICD-10-CM

## 2022-08-29 PROCEDURE — 99203 OFFICE O/P NEW LOW 30 MIN: CPT

## 2022-08-31 PROBLEM — L84 CORN OR CALLUS: Status: ACTIVE | Noted: 2022-08-31

## 2022-08-31 NOTE — HISTORY OF PRESENT ILLNESS
[FreeTextEntry1] : 62 year old M  presents for a diabetic foot evaluation. Mr. LIZARRAGA relates tingling in his feet.  Last A1c was 8.9% 1/2022\par

## 2022-08-31 NOTE — PHYSICAL EXAM
[General Appearance - Alert] : alert [General Appearance - In No Acute Distress] : in no acute distress [2+] : left foot dorsalis pedis 2+ [Vibration Dec.] : diminished vibratory sensation at the level of the toes [Oriented To Time, Place, And Person] : oriented to person, place, and time [Impaired Insight] : insight and judgment were intact [FreeTextEntry1] : calluses submet head 2 right foot and medial aspect of left first toe  [Diminished Throughout Right Foot] : normal sensation with monofilament testing throughout right foot [Diminished Throughout Left Foot] : normal sensation with monofilament testing throughout left foot

## 2022-08-31 NOTE — ASSESSMENT
[FreeTextEntry1] : Modified ADA Diabetic Foot Risk Classification 1 \par \par -Loss of protective sensation: yes  \par -Presence of foot deformity:  no \par -presence of pre-ulcerative lesion:  no\par   -hemorrhage into callus: o\par -atrophy of heel or metatarsal fat pads:  no\par \par -Diabetic neurovascular foot assessment  performed. \par -Discussed with patient diabetic foot hygiene.Patient instructed to regularly check the bottom of the feet\par -Patient given a diabetic foot education sheet\par -Discussed spinal implant for painful neuropathy. Pt wishes to hold off on referral for now\par -Rx compound cream \par -return 6 month\par \par \par

## 2022-12-22 ENCOUNTER — APPOINTMENT (OUTPATIENT)
Dept: INTERNAL MEDICINE | Facility: CLINIC | Age: 62
End: 2022-12-22

## 2022-12-22 ENCOUNTER — OUTPATIENT (OUTPATIENT)
Dept: OUTPATIENT SERVICES | Facility: HOSPITAL | Age: 62
LOS: 1 days | Discharge: HOME | End: 2022-12-22

## 2022-12-22 ENCOUNTER — NON-APPOINTMENT (OUTPATIENT)
Age: 62
End: 2022-12-22

## 2022-12-22 VITALS
DIASTOLIC BLOOD PRESSURE: 62 MMHG | OXYGEN SATURATION: 98 % | TEMPERATURE: 97.5 F | WEIGHT: 131 LBS | HEART RATE: 79 BPM | HEIGHT: 66 IN | SYSTOLIC BLOOD PRESSURE: 109 MMHG | BODY MASS INDEX: 21.05 KG/M2

## 2022-12-22 DIAGNOSIS — N52.9 MALE ERECTILE DYSFUNCTION, UNSPECIFIED: ICD-10-CM

## 2022-12-22 DIAGNOSIS — Z23 ENCOUNTER FOR IMMUNIZATION: ICD-10-CM

## 2022-12-22 DIAGNOSIS — E11.40 TYPE 2 DIABETES MELLITUS WITH DIABETIC NEUROPATHY, UNSPECIFIED: ICD-10-CM

## 2022-12-22 PROCEDURE — 99214 OFFICE O/P EST MOD 30 MIN: CPT | Mod: GC

## 2022-12-22 RX ORDER — SILDENAFIL 25 MG/1
25 TABLET ORAL
Qty: 6 | Refills: 0 | Status: ACTIVE | COMMUNITY
Start: 2020-01-30 | End: 1900-01-01

## 2022-12-22 NOTE — PHYSICAL EXAM
[No Acute Distress] : no acute distress [Well Nourished] : well nourished [Well Developed] : well developed [Well-Appearing] : well-appearing [Normal Outer Ear/Nose] : the outer ears and nose were normal in appearance [Normal Oropharynx] : the oropharynx was normal [Normal TMs] : both tympanic membranes were normal [Normal Nasal Mucosa] : the nasal mucosa was normal [No Lymphadenopathy] : no lymphadenopathy [Thyroid Normal, No Nodules] : the thyroid was normal and there were no nodules present [No Respiratory Distress] : no respiratory distress  [No Accessory Muscle Use] : no accessory muscle use [Clear to Auscultation] : lungs were clear to auscultation bilaterally [Normal Rate] : normal rate  [Regular Rhythm] : with a regular rhythm [Normal S1, S2] : normal S1 and S2 [No Carotid Bruits] : no carotid bruits [No Edema] : there was no peripheral edema [Soft] : abdomen soft [Non Tender] : non-tender [Non-distended] : non-distended [Normal Posterior Cervical Nodes] : no posterior cervical lymphadenopathy [Normal Anterior Cervical Nodes] : no anterior cervical lymphadenopathy [No CVA Tenderness] : no CVA  tenderness [No Spinal Tenderness] : no spinal tenderness [No Focal Deficits] : no focal deficits [Normal Affect] : the affect was normal [Normal Insight/Judgement] : insight and judgment were intact

## 2022-12-22 NOTE — HISTORY OF PRESENT ILLNESS
[FreeTextEntry1] : f/up visit. \par Need refills.  [de-identified] : Pt is here to get his refills. Denies any complaints. No recent blood w/up done. Wants flu shot.

## 2022-12-22 NOTE — ASSESSMENT
[FreeTextEntry1] : #) CAD:\par Pt's symptoms are well controlled. Denies any chest pain or SOB. Continue current meds. \par \par #) DM:\par Refills given. \par Counseling done to eat healthy and low glycemic index foods and do regular exercise.\par \par #) HLD:\par Repeat lipid profile. \par Low fat diet and regular exercise advised.\par \par #) ED:\par Sildenafil given. \par \par #) Neuropathy:\par Continue gabapentin. Pt is taking it twice daily. \par \par #) Pulmonary Nodule: \par CT scheduled. \par \par HCM:\par Referral for colonoscopy given. \par Will give flu shot today. \par Blood w/up scheduled. \par

## 2022-12-30 DIAGNOSIS — E78.00 PURE HYPERCHOLESTEROLEMIA, UNSPECIFIED: ICD-10-CM

## 2022-12-30 DIAGNOSIS — I25.10 ATHEROSCLEROTIC HEART DISEASE OF NATIVE CORONARY ARTERY WITHOUT ANGINA PECTORIS: ICD-10-CM

## 2022-12-30 DIAGNOSIS — R91.1 SOLITARY PULMONARY NODULE: ICD-10-CM

## 2022-12-30 DIAGNOSIS — E11.40 TYPE 2 DIABETES MELLITUS WITH DIABETIC NEUROPATHY, UNSPECIFIED: ICD-10-CM

## 2022-12-30 DIAGNOSIS — Z23 ENCOUNTER FOR IMMUNIZATION: ICD-10-CM

## 2022-12-30 DIAGNOSIS — N52.9 MALE ERECTILE DYSFUNCTION, UNSPECIFIED: ICD-10-CM

## 2023-01-25 ENCOUNTER — NON-APPOINTMENT (OUTPATIENT)
Age: 63
End: 2023-01-25

## 2023-01-30 ENCOUNTER — NON-APPOINTMENT (OUTPATIENT)
Age: 63
End: 2023-01-30

## 2023-01-31 ENCOUNTER — NON-APPOINTMENT (OUTPATIENT)
Age: 63
End: 2023-01-31

## 2023-02-23 ENCOUNTER — NON-APPOINTMENT (OUTPATIENT)
Age: 63
End: 2023-02-23

## 2023-02-23 NOTE — ED ADULT NURSE NOTE - PAIN RATING/NUMBER SCALE (0-10): REST
0 Humira Counseling:  I discussed with the patient the risks of adalimumab including but not limited to myelosuppression, immunosuppression, autoimmune hepatitis, demyelinating diseases, lymphoma, and serious infections.  The patient understands that monitoring is required including a PPD at baseline and must alert us or the primary physician if symptoms of infection or other concerning signs are noted.

## 2023-02-27 ENCOUNTER — APPOINTMENT (OUTPATIENT)
Dept: PODIATRY | Facility: CLINIC | Age: 63
End: 2023-02-27

## 2023-03-08 ENCOUNTER — APPOINTMENT (OUTPATIENT)
Dept: INTERNAL MEDICINE | Facility: CLINIC | Age: 63
End: 2023-03-08

## 2023-03-20 ENCOUNTER — APPOINTMENT (OUTPATIENT)
Dept: PODIATRY | Facility: CLINIC | Age: 63
End: 2023-03-20

## 2023-04-01 ENCOUNTER — RX RENEWAL (OUTPATIENT)
Age: 63
End: 2023-04-01

## 2023-06-19 NOTE — DISCHARGE NOTE ADULT - HOSPITAL COURSE
Detail Level: Detailed
59 yo M with h/o DM, Anxiety, depression presents to ED with sudden onset of left sided chest pain which started 2 hrs ago, The Chest Pain is associated with nausea, pain still persisted while in the ER, EKG revealed ST elevation in inferior  leads labs showed negative troponin, pt was rushed to cath lab for PCI and stent placed in RCA.     PHYSICAL EXAM:  GENERAL: NAD, well-developed  HEAD:  Atraumatic, Normocephalic  EYES: EOMI, PERRLA, conjunctiva and sclera clear  NECK: Supple, No JVD  CHEST/LUNG: Clear to auscultation bilaterally; No wheeze  HEART: Regular rate and rhythm; S1 S2  ABDOMEN: Soft, Nontender, Nondistended; Bowel sounds present  EXTREMITIES:  2+ Peripheral Pulses, No clubbing, cyanosis, or edema  PSYCH: AAOx3  NEUROLOGY: non-focal  SKIN: No rashes or lesions    A/P:   1. Acute Inferior ST elevation Myocardial infarction:   s/p PCI and RCA stenting.   Continue ASA, Brilinta  Continue Lopressor and Lipitor  Cardiology follow up outpatient  Counseled for smoking cessation.     DM type 2:   Continue Metformin.     Chronic Pain: on left foot,   he takes Methadone 70g daily,     Tobacco abuse:   Counseled for smoking cessation  start on Nicotine patch

## 2024-05-06 ENCOUNTER — APPOINTMENT (OUTPATIENT)
Dept: PODIATRY | Facility: CLINIC | Age: 64
End: 2024-05-06

## 2024-05-17 ENCOUNTER — OUTPATIENT (OUTPATIENT)
Dept: OUTPATIENT SERVICES | Facility: HOSPITAL | Age: 64
LOS: 1 days | End: 2024-05-17
Payer: MEDICAID

## 2024-05-17 ENCOUNTER — APPOINTMENT (OUTPATIENT)
Dept: INTERNAL MEDICINE | Facility: CLINIC | Age: 64
End: 2024-05-17
Payer: MEDICAID

## 2024-05-17 VITALS
HEART RATE: 78 BPM | OXYGEN SATURATION: 100 % | HEIGHT: 66 IN | DIASTOLIC BLOOD PRESSURE: 71 MMHG | WEIGHT: 135 LBS | SYSTOLIC BLOOD PRESSURE: 139 MMHG | BODY MASS INDEX: 21.69 KG/M2

## 2024-05-17 DIAGNOSIS — I25.10 ATHEROSCLEROTIC HEART DISEASE OF NATIVE CORONARY ARTERY W/OUT ANGINA PECTORIS: ICD-10-CM

## 2024-05-17 DIAGNOSIS — R91.1 SOLITARY PULMONARY NODULE: ICD-10-CM

## 2024-05-17 DIAGNOSIS — E78.00 PURE HYPERCHOLESTEROLEMIA, UNSPECIFIED: ICD-10-CM

## 2024-05-17 DIAGNOSIS — F32.A DEPRESSION, UNSPECIFIED: ICD-10-CM

## 2024-05-17 DIAGNOSIS — E11.29 TYPE 2 DIABETES MELLITUS WITH OTHER DIABETIC KIDNEY COMPLICATION: ICD-10-CM

## 2024-05-17 DIAGNOSIS — Z00.00 ENCOUNTER FOR GENERAL ADULT MEDICAL EXAMINATION WITHOUT ABNORMAL FINDINGS: ICD-10-CM

## 2024-05-17 DIAGNOSIS — R80.9 TYPE 2 DIABETES MELLITUS WITH OTHER DIABETIC KIDNEY COMPLICATION: ICD-10-CM

## 2024-05-17 DIAGNOSIS — I82.409 ACUTE EMBOLISM AND THROMBOSIS OF UNSPECIFIED DEEP VEINS OF UNSPECIFIED LOWER EXTREMITY: ICD-10-CM

## 2024-05-17 PROCEDURE — G2211 COMPLEX E/M VISIT ADD ON: CPT | Mod: NC,1L

## 2024-05-17 PROCEDURE — 99214 OFFICE O/P EST MOD 30 MIN: CPT

## 2024-05-17 RX ORDER — GABAPENTIN 600 MG/1
600 TABLET, COATED ORAL
Qty: 60 | Refills: 0 | Status: DISCONTINUED | COMMUNITY
Start: 2018-11-08 | End: 2024-05-17

## 2024-05-17 RX ORDER — CALCIUM CARB/VITAMIN D3/VIT K1 500-100-40
31G X 5/16" TABLET,CHEWABLE ORAL
Qty: 1 | Refills: 0 | Status: DISCONTINUED | COMMUNITY
Start: 2018-11-08 | End: 2024-05-17

## 2024-05-17 RX ORDER — BUPRENORPHINE AND NALOXONE 8; 2 MG/1; MG/1
8-2 TABLET SUBLINGUAL
Refills: 0 | Status: ACTIVE | COMMUNITY

## 2024-05-17 RX ORDER — PANTOPRAZOLE 40 MG/1
40 TABLET, DELAYED RELEASE ORAL DAILY
Qty: 30 | Refills: 2 | Status: ACTIVE | COMMUNITY
Start: 2024-05-17 | End: 1900-01-01

## 2024-05-17 RX ORDER — APIXABAN 5 MG/1
5 TABLET, FILM COATED ORAL
Refills: 0 | Status: ACTIVE | COMMUNITY

## 2024-05-17 RX ORDER — NICOTINE 21 MG/24HR
21 PATCH, TRANSDERMAL 24 HOURS TRANSDERMAL DAILY
Qty: 30 | Refills: 2 | Status: ACTIVE | COMMUNITY
Start: 2024-05-17 | End: 1900-01-01

## 2024-05-17 RX ORDER — DICLOFENAC SODIUM 1% 10 MG/G
1 GEL TOPICAL
Qty: 1 | Refills: 2 | Status: DISCONTINUED | COMMUNITY
Start: 2022-08-31 | End: 2024-05-17

## 2024-05-17 RX ORDER — INSULIN GLARGINE 100 [IU]/ML
100 INJECTION, SOLUTION SUBCUTANEOUS
Qty: 2 | Refills: 0 | Status: DISCONTINUED | COMMUNITY
Start: 2018-08-06 | End: 2024-05-17

## 2024-05-17 RX ORDER — MIRTAZAPINE 15 MG/1
15 TABLET, FILM COATED ORAL
Refills: 0 | Status: ACTIVE | COMMUNITY

## 2024-05-17 RX ORDER — TICAGRELOR 90 MG/1
90 TABLET ORAL TWICE DAILY
Qty: 60 | Refills: 2 | Status: DISCONTINUED | COMMUNITY
End: 2024-05-17

## 2024-05-17 RX ORDER — ENALAPRIL MALEATE 2.5 MG/1
2.5 TABLET ORAL
Qty: 30 | Refills: 2 | Status: ACTIVE | COMMUNITY
Start: 2019-10-21 | End: 1900-01-01

## 2024-05-17 RX ORDER — METOPROLOL TARTRATE 25 MG/1
25 TABLET, FILM COATED ORAL
Qty: 60 | Refills: 2 | Status: ACTIVE | COMMUNITY
Start: 2019-10-21 | End: 1900-01-01

## 2024-05-17 RX ORDER — ASPIRIN ENTERIC COATED TABLETS 81 MG 81 MG/1
81 TABLET, DELAYED RELEASE ORAL
Qty: 30 | Refills: 2 | Status: ACTIVE | COMMUNITY
Start: 2024-05-17 | End: 1900-01-01

## 2024-05-17 RX ORDER — EZETIMIBE 10 MG/1
10 TABLET ORAL
Qty: 30 | Refills: 2 | Status: ACTIVE | COMMUNITY
Start: 1900-01-01 | End: 1900-01-01

## 2024-05-17 RX ORDER — BUPROPION HYDROCHLORIDE 75 MG/1
75 TABLET, FILM COATED ORAL
Refills: 0 | Status: ACTIVE | COMMUNITY

## 2024-05-17 RX ORDER — PRAVASTATIN SODIUM 80 MG/1
80 TABLET ORAL DAILY
Qty: 30 | Refills: 2 | Status: ACTIVE | COMMUNITY
Start: 2024-05-17 | End: 1900-01-01

## 2024-05-17 NOTE — REVIEW OF SYSTEMS
[Fever] : no fever [Earache] : no earache [Chest Pain] : no chest pain [Shortness Of Breath] : no shortness of breath [Abdominal Pain] : no abdominal pain [Dysuria] : no dysuria [Headache] : no headache [FreeTextEntry9] : pain at dvt  [de-identified] : swolloen skin by dvt

## 2024-05-17 NOTE — HISTORY OF PRESENT ILLNESS
[FreeTextEntry1] : follow up visit after hospitalization for DVT [de-identified] : 64 year old male recently discharged in May from [facility] left DVT treated with thrombectomy. PMH HTN, CAD (S/p stents placement at age 58), Substance Use disorder, DM, NIDDM with microalbuminuria/neuropathy, hypercholesterolemia, depression, vitamin D deficiency presents for hospital follow up.

## 2024-05-17 NOTE — PHYSICAL EXAM
[No Acute Distress] : no acute distress [Normal Sclera/Conjunctiva] : normal sclera/conjunctiva [Normal Outer Ear/Nose] : the outer ears and nose were normal in appearance [Supple] : supple [No Respiratory Distress] : no respiratory distress  [Normal S1, S2] : normal S1 and S2 [Soft] : abdomen soft [Non Tender] : non-tender [Non-distended] : non-distended [No Spinal Tenderness] : no spinal tenderness [Coordination Grossly Intact] : coordination grossly intact [Normal Affect] : the affect was normal [de-identified] : edema left lower extremity extending up to groin  [de-identified] : swelling left lower extremity  [de-identified] : swollen left lower extrmity

## 2024-05-17 NOTE — ASSESSMENT
[FreeTextEntry1] : #) CAD: #HLD Pt's symptoms are well controlled. Denies any chest pain or SOB. - c/w asp, enalapril, metoprolol, pravastatin, ezetimibe  - will refer for cardio follow up   #dvt - following with hematologist who ordered eliquis - pain management referral   #depression #h/o drug abuse - psych referral   #) DM: - c/w metformin   #) ED: previously was treated with Sildenafil  #) Neuropathy: - was taking gabapentin   #) Pulmonary Nodule: ordered ct   HCM: - patient has colonoscopy scheduled at [facility]   - routine labs

## 2024-05-20 ENCOUNTER — APPOINTMENT (OUTPATIENT)
Dept: INTERNAL MEDICINE | Facility: CLINIC | Age: 64
End: 2024-05-20

## 2024-05-23 DIAGNOSIS — F32.A DEPRESSION, UNSPECIFIED: ICD-10-CM

## 2024-05-23 DIAGNOSIS — R91.1 SOLITARY PULMONARY NODULE: ICD-10-CM

## 2024-05-23 DIAGNOSIS — E11.29 TYPE 2 DIABETES MELLITUS WITH OTHER DIABETIC KIDNEY COMPLICATION: ICD-10-CM

## 2024-05-23 DIAGNOSIS — E78.00 PURE HYPERCHOLESTEROLEMIA, UNSPECIFIED: ICD-10-CM

## 2024-05-23 DIAGNOSIS — I25.10 ATHEROSCLEROTIC HEART DISEASE OF NATIVE CORONARY ARTERY WITHOUT ANGINA PECTORIS: ICD-10-CM

## 2024-05-23 DIAGNOSIS — I82.409 ACUTE EMBOLISM AND THROMBOSIS OF UNSPECIFIED DEEP VEINS OF UNSPECIFIED LOWER EXTREMITY: ICD-10-CM

## 2024-07-18 NOTE — ED ADULT NURSE NOTE - PERIPHERAL VASCULAR WDL
Problem: Safety - Adult  Goal: Free from fall injury  Outcome: Progressing  Flowsheets (Taken 7/18/2024 1635)  Free From Fall Injury: Instruct family/caregiver on patient safety     Problem: ABCDS Injury Assessment  Goal: Absence of physical injury  Outcome: Progressing  Flowsheets (Taken 7/18/2024 1635)  Absence of Physical Injury: Implement safety measures based on patient assessment     Problem: Neurosensory - Adult  Goal: Achieves maximal functionality and self care  Outcome: Progressing  Flowsheets (Taken 7/18/2024 1644)  Achieves maximal functionality and self care: Encourage and assist patient to increase activity and self care with guidance from physical therapy/occupational therapy     Problem: Respiratory - Adult  Goal: Achieves optimal ventilation and oxygenation  Outcome: Progressing  Flowsheets  Taken 7/18/2024 1644  Achieves optimal ventilation and oxygenation: Assess for changes in respiratory status  Taken 7/18/2024 1405  Achieves optimal ventilation and oxygenation:   Assess for changes in respiratory status   Encourage broncho-pulmonary hygiene including cough, deep breathe, incentive spirometry     Problem: Cardiovascular - Adult  Goal: Maintains optimal cardiac output and hemodynamic stability  Outcome: Progressing  Flowsheets (Taken 7/18/2024 1405)  Maintains optimal cardiac output and hemodynamic stability:   Monitor blood pressure and heart rate   Monitor urine output and notify Licensed Independent Practitioner for values outside of normal range     Problem: Skin/Tissue Integrity - Adult  Goal: Incisions, wounds, or drain sites healing without S/S of infection  Outcome: Progressing  Flowsheets (Taken 7/18/2024 1644)  Incisions, Wounds, or Drain Sites Healing Without Sign and Symptoms of Infection: TWICE DAILY: Assess and document skin integrity     Problem: Musculoskeletal - Adult  Goal: Return ADL status to a safe level of function  Outcome: Progressing  Flowsheets (Taken 7/18/2024  comorbid symptoms for stability, deterioration, or improvement     Problem: Pain  Goal: Verbalizes/displays adequate comfort level or baseline comfort level  Outcome: Progressing  Flowsheets (Taken 7/18/2024 0635)  Verbalizes/displays adequate comfort level or baseline comfort level:   Encourage patient to monitor pain and request assistance   Assess pain using appropriate pain scale      Pulses equal bilaterally, no edema present.

## 2024-09-30 ENCOUNTER — APPOINTMENT (OUTPATIENT)
Dept: INTERNAL MEDICINE | Facility: CLINIC | Age: 64
End: 2024-09-30
Payer: MEDICAID

## 2024-09-30 ENCOUNTER — OUTPATIENT (OUTPATIENT)
Dept: OUTPATIENT SERVICES | Facility: HOSPITAL | Age: 64
LOS: 1 days | End: 2024-09-30
Payer: MEDICAID

## 2024-09-30 VITALS
WEIGHT: 139.38 LBS | OXYGEN SATURATION: 97 % | DIASTOLIC BLOOD PRESSURE: 63 MMHG | HEIGHT: 66 IN | TEMPERATURE: 97.5 F | BODY MASS INDEX: 22.4 KG/M2 | HEART RATE: 68 BPM | SYSTOLIC BLOOD PRESSURE: 107 MMHG

## 2024-09-30 DIAGNOSIS — R80.9 TYPE 2 DIABETES MELLITUS WITH OTHER DIABETIC KIDNEY COMPLICATION: ICD-10-CM

## 2024-09-30 DIAGNOSIS — E78.00 PURE HYPERCHOLESTEROLEMIA, UNSPECIFIED: ICD-10-CM

## 2024-09-30 DIAGNOSIS — M25.541 PAIN IN JOINTS OF RIGHT HAND: ICD-10-CM

## 2024-09-30 DIAGNOSIS — M25.542 PAIN IN JOINTS OF RIGHT HAND: ICD-10-CM

## 2024-09-30 DIAGNOSIS — I82.409 ACUTE EMBOLISM AND THROMBOSIS OF UNSPECIFIED DEEP VEINS OF UNSPECIFIED LOWER EXTREMITY: ICD-10-CM

## 2024-09-30 DIAGNOSIS — F32.A DEPRESSION, UNSPECIFIED: ICD-10-CM

## 2024-09-30 DIAGNOSIS — Z00.00 ENCOUNTER FOR GENERAL ADULT MEDICAL EXAMINATION WITHOUT ABNORMAL FINDINGS: ICD-10-CM

## 2024-09-30 DIAGNOSIS — Z23 ENCOUNTER FOR IMMUNIZATION: ICD-10-CM

## 2024-09-30 DIAGNOSIS — I25.10 ATHEROSCLEROTIC HEART DISEASE OF NATIVE CORONARY ARTERY W/OUT ANGINA PECTORIS: ICD-10-CM

## 2024-09-30 DIAGNOSIS — E11.29 TYPE 2 DIABETES MELLITUS WITH OTHER DIABETIC KIDNEY COMPLICATION: ICD-10-CM

## 2024-09-30 DIAGNOSIS — R91.1 SOLITARY PULMONARY NODULE: ICD-10-CM

## 2024-09-30 DIAGNOSIS — N52.9 MALE ERECTILE DYSFUNCTION, UNSPECIFIED: ICD-10-CM

## 2024-09-30 PROCEDURE — 99214 OFFICE O/P EST MOD 30 MIN: CPT | Mod: GC,25

## 2024-09-30 PROCEDURE — G2211 COMPLEX E/M VISIT ADD ON: CPT | Mod: NC

## 2024-09-30 PROCEDURE — 99214 OFFICE O/P EST MOD 30 MIN: CPT

## 2024-09-30 PROCEDURE — 90656 IIV3 VACC NO PRSV 0.5 ML IM: CPT

## 2024-09-30 PROCEDURE — 90471 IMMUNIZATION ADMIN: CPT | Mod: 25

## 2024-09-30 NOTE — END OF VISIT
[] : Resident [FreeTextEntry3] : Pt. did not complete blood work and chest CT ordered by Dr. Luna, and did not schedule cardiology appointment, advised pt. to complete blood work, schedule cardiology appointment and complete chest CT for follow up pulmonary nodule.

## 2024-09-30 NOTE — PHYSICAL EXAM
[No Acute Distress] : no acute distress [Well Nourished] : well nourished [Well Developed] : well developed [Well-Appearing] : well-appearing [No Respiratory Distress] : no respiratory distress  [No Accessory Muscle Use] : no accessory muscle use [Clear to Auscultation] : lungs were clear to auscultation bilaterally [Normal Rate] : normal rate  [Regular Rhythm] : with a regular rhythm [Normal S1, S2] : normal S1 and S2 [No Murmur] : no murmur heard [Pedal Pulses Present] : the pedal pulses are present [No Extremity Clubbing/Cyanosis] : no extremity clubbing/cyanosis [Soft] : abdomen soft [Non Tender] : non-tender [Non-distended] : non-distended [No Masses] : no abdominal mass palpated [No HSM] : no HSM [Normal Bowel Sounds] : normal bowel sounds [Normal Posterior Cervical Nodes] : no posterior cervical lymphadenopathy [Normal Anterior Cervical Nodes] : no anterior cervical lymphadenopathy [No CVA Tenderness] : no CVA  tenderness [No Spinal Tenderness] : no spinal tenderness [No Joint Swelling] : no joint swelling [Grossly Normal Strength/Tone] : grossly normal strength/tone [No Rash] : no rash [Coordination Grossly Intact] : coordination grossly intact [No Focal Deficits] : no focal deficits [Deep Tendon Reflexes (DTR)] : deep tendon reflexes were 2+ and symmetric [Normal Affect] : the affect was normal [Normal Insight/Judgement] : insight and judgment were intact [de-identified] : + Minimal to trace L lower extremity edema, visible diffuse erythema, and warmthness.  [de-identified] : (-) for joint tenderness/erythema/nodules/skin changes at the affected bilateral PIP joints.

## 2024-09-30 NOTE — HISTORY OF PRESENT ILLNESS
[FreeTextEntry1] : follow up [de-identified] : The patient is a 64 year old male with a PMHx of left DVT (treated with thrombectomy, currently on Eliquis 5 mg BID), HTN, CAD (s/p stents placement at age 58), Substance Use disorder, NIDDM with microalbuminuria/neuropathy, hypercholesterolemia, depression, vitamin D deficiency who presents for follow up. Patient complains of chronic bilateral hand pain at his proximal joints, which he describes is 8/10 in severity, associated with stiffness. Patient notes it is so severe he cannot open his coffee lid. He describes the pain feels like "arthritis", minimally relieved with Advil and hot compresses. Patient also endorses erectile dysfunction not relieved with his current Viagra prescription and would like to explore other medications. Patient requesting refills in his medications. ROS otherwise negative for chest pain, SOB, palpitation, n/v/diarrhea, headache, urinary complaints.

## 2024-09-30 NOTE — REVIEW OF SYSTEMS
[Negative] : Psychiatric [Joint Pain] : joint pain [Joint Stiffness] : joint stiffness [Muscle Pain] : no muscle pain [Joint Swelling] : no joint swelling [FreeTextEntry8] : +erectile dysfunction [FreeTextEntry9] : +bilateral finger joint pain

## 2024-09-30 NOTE — ASSESSMENT
[FreeTextEntry1] : #Bilteral finger joint pain - Due to years of wear and tear as a  - Counseled patient to limit use of NSAIDs due to pre-existing kidney disease - Recommended to try Tylenol and warm compresses - Will reassess at the next visit   #) ED: - sx still persisting with current Sildenafil dose - will increase to Sildenafil 50 mg once daily   #) Pulmonary Nodule: - Ordered Noncon chest CT - Pt advised to follow through with imaging  #CAD #HLD - c/w asp, enalapril, metoprolol,  - c/w pravastatin, ezetimibe  #Previous Hx of DVT - following with hematologist at [facility] ([facility]) - c/w Eliquis 5.0 mg BID  #) DM: - c/w metformin and Basaglar 20units  #) Neuropathy- RESOLVED  - not currently taking gabapentin but would like to resume - Pt refusing pain management refferal  #Depression-RESOLVED #H/O drug abuse - Sx negative for SIGECAPS - Pt refusing psych referral- saying sx are controlled  HCM: - GI: colonoscopy, cardiologist referral - DM:  Podiatry/ Optho referal - Chest CT for pulmonary nodule - medications refilled - routine labs - flu vaccine to be given today - follow up in 4 months

## 2024-10-01 DIAGNOSIS — H91.93 UNSPECIFIED HEARING LOSS, BILATERAL: ICD-10-CM

## 2024-10-03 ENCOUNTER — NON-APPOINTMENT (OUTPATIENT)
Age: 64
End: 2024-10-03

## 2024-10-03 DIAGNOSIS — Z23 ENCOUNTER FOR IMMUNIZATION: ICD-10-CM

## 2024-10-03 DIAGNOSIS — E11.29 TYPE 2 DIABETES MELLITUS WITH OTHER DIABETIC KIDNEY COMPLICATION: ICD-10-CM

## 2024-10-03 DIAGNOSIS — F32.A DEPRESSION, UNSPECIFIED: ICD-10-CM

## 2024-10-03 DIAGNOSIS — N52.9 MALE ERECTILE DYSFUNCTION, UNSPECIFIED: ICD-10-CM

## 2024-10-03 DIAGNOSIS — E78.00 PURE HYPERCHOLESTEROLEMIA, UNSPECIFIED: ICD-10-CM

## 2024-10-03 DIAGNOSIS — I82.409 ACUTE EMBOLISM AND THROMBOSIS OF UNSPECIFIED DEEP VEINS OF UNSPECIFIED LOWER EXTREMITY: ICD-10-CM

## 2024-10-03 DIAGNOSIS — R91.1 SOLITARY PULMONARY NODULE: ICD-10-CM

## 2024-10-03 DIAGNOSIS — I25.10 ATHEROSCLEROTIC HEART DISEASE OF NATIVE CORONARY ARTERY WITHOUT ANGINA PECTORIS: ICD-10-CM

## 2024-10-03 DIAGNOSIS — M25.541 PAIN IN JOINTS OF RIGHT HAND: ICD-10-CM

## 2024-10-16 ENCOUNTER — OUTPATIENT (OUTPATIENT)
Dept: OUTPATIENT SERVICES | Facility: HOSPITAL | Age: 64
LOS: 1 days | End: 2024-10-16
Payer: MEDICAID

## 2024-10-16 ENCOUNTER — APPOINTMENT (OUTPATIENT)
Dept: SPEECH THERAPY | Facility: CLINIC | Age: 64
End: 2024-10-16

## 2024-10-16 ENCOUNTER — LABORATORY RESULT (OUTPATIENT)
Age: 64
End: 2024-10-16

## 2024-10-16 DIAGNOSIS — H90.3 SENSORINEURAL HEARING LOSS, BILATERAL: ICD-10-CM

## 2024-10-16 DIAGNOSIS — Z00.00 ENCOUNTER FOR GENERAL ADULT MEDICAL EXAMINATION WITHOUT ABNORMAL FINDINGS: ICD-10-CM

## 2024-10-16 PROCEDURE — 81001 URINALYSIS AUTO W/SCOPE: CPT

## 2024-10-16 PROCEDURE — 92550 TYMPANOMETRY & REFLEX THRESH: CPT

## 2024-10-16 PROCEDURE — 80053 COMPREHEN METABOLIC PANEL: CPT

## 2024-10-16 PROCEDURE — 83036 HEMOGLOBIN GLYCOSYLATED A1C: CPT

## 2024-10-16 PROCEDURE — 85027 COMPLETE CBC AUTOMATED: CPT

## 2024-10-16 PROCEDURE — 92557 COMPREHENSIVE HEARING TEST: CPT

## 2024-10-16 PROCEDURE — 80061 LIPID PANEL: CPT

## 2024-10-16 PROCEDURE — 84443 ASSAY THYROID STIM HORMONE: CPT

## 2024-10-17 DIAGNOSIS — Z00.00 ENCOUNTER FOR GENERAL ADULT MEDICAL EXAMINATION WITHOUT ABNORMAL FINDINGS: ICD-10-CM

## 2024-10-24 ENCOUNTER — APPOINTMENT (OUTPATIENT)
Dept: INTERNAL MEDICINE | Facility: CLINIC | Age: 64
End: 2024-10-24

## 2024-10-31 ENCOUNTER — APPOINTMENT (OUTPATIENT)
Dept: ENDOCRINOLOGY | Facility: CLINIC | Age: 64
End: 2024-10-31

## 2024-10-31 ENCOUNTER — OUTPATIENT (OUTPATIENT)
Dept: OUTPATIENT SERVICES | Facility: HOSPITAL | Age: 64
LOS: 1 days | End: 2024-10-31
Payer: MEDICAID

## 2024-10-31 VITALS
HEART RATE: 80 BPM | HEIGHT: 66 IN | BODY MASS INDEX: 23.14 KG/M2 | DIASTOLIC BLOOD PRESSURE: 60 MMHG | WEIGHT: 144 LBS | SYSTOLIC BLOOD PRESSURE: 119 MMHG

## 2024-10-31 DIAGNOSIS — R80.9 TYPE 2 DIABETES MELLITUS WITH OTHER DIABETIC KIDNEY COMPLICATION: ICD-10-CM

## 2024-10-31 DIAGNOSIS — E11.65 TYPE 2 DIABETES MELLITUS WITH HYPERGLYCEMIA: ICD-10-CM

## 2024-10-31 DIAGNOSIS — E11.29 TYPE 2 DIABETES MELLITUS WITH OTHER DIABETIC KIDNEY COMPLICATION: ICD-10-CM

## 2024-10-31 PROCEDURE — 99204 OFFICE O/P NEW MOD 45 MIN: CPT

## 2024-10-31 RX ORDER — BLOOD-GLUCOSE,RECEIVER,CONT
EACH MISCELLANEOUS
Qty: 1 | Refills: 0 | Status: ACTIVE | COMMUNITY
Start: 2024-10-31 | End: 1900-01-01

## 2024-10-31 RX ORDER — BLOOD-GLUCOSE SENSOR
EACH MISCELLANEOUS
Qty: 6 | Refills: 2 | Status: ACTIVE | COMMUNITY
Start: 2024-10-31 | End: 1900-01-01

## 2024-10-31 RX ORDER — BLOOD SUGAR DIAGNOSTIC
32G X 5 MM STRIP MISCELLANEOUS
Qty: 100 | Refills: 2 | Status: ACTIVE | COMMUNITY
Start: 2024-10-31 | End: 1900-01-01

## 2024-10-31 RX ORDER — TIRZEPATIDE 2.5 MG/.5ML
2.5 INJECTION, SOLUTION SUBCUTANEOUS
Qty: 1 | Refills: 5 | Status: ACTIVE | COMMUNITY
Start: 2024-10-31 | End: 1900-01-01

## 2024-11-06 DIAGNOSIS — E78.5 HYPERLIPIDEMIA, UNSPECIFIED: ICD-10-CM

## 2024-11-06 DIAGNOSIS — E11.65 TYPE 2 DIABETES MELLITUS WITH HYPERGLYCEMIA: ICD-10-CM

## 2024-11-12 ENCOUNTER — APPOINTMENT (OUTPATIENT)
Dept: NUTRITION | Facility: CLINIC | Age: 64
End: 2024-11-12

## 2024-11-18 ENCOUNTER — NON-APPOINTMENT (OUTPATIENT)
Age: 64
End: 2024-11-18

## 2024-11-18 ENCOUNTER — APPOINTMENT (OUTPATIENT)
Dept: NUTRITION | Facility: CLINIC | Age: 64
End: 2024-11-18

## 2024-11-18 ENCOUNTER — OUTPATIENT (OUTPATIENT)
Dept: OUTPATIENT SERVICES | Facility: HOSPITAL | Age: 64
LOS: 1 days | End: 2024-11-18
Payer: COMMERCIAL

## 2024-11-18 ENCOUNTER — APPOINTMENT (OUTPATIENT)
Dept: OPHTHALMOLOGY | Facility: CLINIC | Age: 64
End: 2024-11-18
Payer: COMMERCIAL

## 2024-11-18 ENCOUNTER — OUTPATIENT (OUTPATIENT)
Dept: OUTPATIENT SERVICES | Facility: HOSPITAL | Age: 64
LOS: 1 days | End: 2024-11-18
Payer: MEDICAID

## 2024-11-18 DIAGNOSIS — H53.8 OTHER VISUAL DISTURBANCES: ICD-10-CM

## 2024-11-18 DIAGNOSIS — E11.3299 TYPE 2 DIABETES MELLITUS WITH MILD NONPROLIFERATIVE DIABETIC RETINOPATHY WITHOUT MACULAR EDEMA, UNSPECIFIED EYE: ICD-10-CM

## 2024-11-18 DIAGNOSIS — E11.29 TYPE 2 DIABETES MELLITUS WITH OTHER DIABETIC KIDNEY COMPLICATION: ICD-10-CM

## 2024-11-18 PROCEDURE — G0108: CPT

## 2024-11-18 PROCEDURE — 92134 CPTRZ OPH DX IMG PST SGM RTA: CPT | Mod: 26

## 2024-11-18 PROCEDURE — 99203 OFFICE O/P NEW LOW 30 MIN: CPT

## 2024-11-18 PROCEDURE — 99213 OFFICE O/P EST LOW 20 MIN: CPT

## 2024-11-18 PROCEDURE — 92134 CPTRZ OPH DX IMG PST SGM RTA: CPT

## 2024-11-20 DIAGNOSIS — E11.29 TYPE 2 DIABETES MELLITUS WITH OTHER DIABETIC KIDNEY COMPLICATION: ICD-10-CM

## 2025-01-06 ENCOUNTER — APPOINTMENT (OUTPATIENT)
Dept: INTERNAL MEDICINE | Facility: CLINIC | Age: 65
End: 2025-01-06

## 2025-01-07 ENCOUNTER — APPOINTMENT (OUTPATIENT)
Dept: OPHTHALMOLOGY | Facility: CLINIC | Age: 65
End: 2025-01-07

## 2025-01-16 ENCOUNTER — APPOINTMENT (OUTPATIENT)
Dept: ENDOCRINOLOGY | Facility: CLINIC | Age: 65
End: 2025-01-16

## 2025-01-28 ENCOUNTER — NON-APPOINTMENT (OUTPATIENT)
Age: 65
End: 2025-01-28

## 2025-01-28 RX ORDER — SEMAGLUTIDE 0.68 MG/ML
2 INJECTION, SOLUTION SUBCUTANEOUS
Qty: 1 | Refills: 5 | Status: ACTIVE | COMMUNITY
Start: 2025-01-28 | End: 1900-01-01

## 2025-01-29 ENCOUNTER — NON-APPOINTMENT (OUTPATIENT)
Age: 65
End: 2025-01-29

## 2025-02-13 ENCOUNTER — NON-APPOINTMENT (OUTPATIENT)
Age: 65
End: 2025-02-13

## 2025-02-24 ENCOUNTER — APPOINTMENT (OUTPATIENT)
Dept: INTERNAL MEDICINE | Facility: CLINIC | Age: 65
End: 2025-02-24
Payer: COMMERCIAL

## 2025-02-24 ENCOUNTER — OUTPATIENT (OUTPATIENT)
Dept: OUTPATIENT SERVICES | Facility: HOSPITAL | Age: 65
LOS: 1 days | End: 2025-02-24
Payer: COMMERCIAL

## 2025-02-24 VITALS
HEIGHT: 66 IN | BODY MASS INDEX: 22.83 KG/M2 | SYSTOLIC BLOOD PRESSURE: 135 MMHG | HEART RATE: 79 BPM | TEMPERATURE: 97.3 F | WEIGHT: 142.06 LBS | DIASTOLIC BLOOD PRESSURE: 69 MMHG | OXYGEN SATURATION: 97 %

## 2025-02-24 DIAGNOSIS — E11.29 TYPE 2 DIABETES MELLITUS WITH OTHER DIABETIC KIDNEY COMPLICATION: ICD-10-CM

## 2025-02-24 DIAGNOSIS — M25.542 PAIN IN JOINTS OF RIGHT HAND: ICD-10-CM

## 2025-02-24 DIAGNOSIS — R80.9 TYPE 2 DIABETES MELLITUS WITH OTHER DIABETIC KIDNEY COMPLICATION: ICD-10-CM

## 2025-02-24 DIAGNOSIS — N52.9 MALE ERECTILE DYSFUNCTION, UNSPECIFIED: ICD-10-CM

## 2025-02-24 DIAGNOSIS — E78.00 PURE HYPERCHOLESTEROLEMIA, UNSPECIFIED: ICD-10-CM

## 2025-02-24 DIAGNOSIS — I25.10 ATHEROSCLEROTIC HEART DISEASE OF NATIVE CORONARY ARTERY W/OUT ANGINA PECTORIS: ICD-10-CM

## 2025-02-24 DIAGNOSIS — M25.541 PAIN IN JOINTS OF RIGHT HAND: ICD-10-CM

## 2025-02-24 DIAGNOSIS — Z00.00 ENCOUNTER FOR GENERAL ADULT MEDICAL EXAMINATION WITHOUT ABNORMAL FINDINGS: ICD-10-CM

## 2025-02-24 DIAGNOSIS — R91.1 SOLITARY PULMONARY NODULE: ICD-10-CM

## 2025-02-24 DIAGNOSIS — Z72.0 TOBACCO USE: ICD-10-CM

## 2025-02-24 PROCEDURE — 99214 OFFICE O/P EST MOD 30 MIN: CPT | Mod: GC

## 2025-02-24 PROCEDURE — 99214 OFFICE O/P EST MOD 30 MIN: CPT

## 2025-02-25 ENCOUNTER — NON-APPOINTMENT (OUTPATIENT)
Age: 65
End: 2025-02-25

## 2025-02-26 ENCOUNTER — NON-APPOINTMENT (OUTPATIENT)
Age: 65
End: 2025-02-26

## 2025-02-27 ENCOUNTER — APPOINTMENT (OUTPATIENT)
Dept: ENDOCRINOLOGY | Facility: CLINIC | Age: 65
End: 2025-02-27

## 2025-03-03 DIAGNOSIS — I25.10 ATHEROSCLEROTIC HEART DISEASE OF NATIVE CORONARY ARTERY WITHOUT ANGINA PECTORIS: ICD-10-CM

## 2025-03-03 DIAGNOSIS — E78.00 PURE HYPERCHOLESTEROLEMIA, UNSPECIFIED: ICD-10-CM

## 2025-03-03 DIAGNOSIS — R91.1 SOLITARY PULMONARY NODULE: ICD-10-CM

## 2025-03-03 DIAGNOSIS — Z72.0 TOBACCO USE: ICD-10-CM

## 2025-03-03 DIAGNOSIS — M25.541 PAIN IN JOINTS OF RIGHT HAND: ICD-10-CM

## 2025-03-03 DIAGNOSIS — N52.9 MALE ERECTILE DYSFUNCTION, UNSPECIFIED: ICD-10-CM

## 2025-03-03 DIAGNOSIS — E11.29 TYPE 2 DIABETES MELLITUS WITH OTHER DIABETIC KIDNEY COMPLICATION: ICD-10-CM

## 2025-04-14 ENCOUNTER — LABORATORY RESULT (OUTPATIENT)
Age: 65
End: 2025-04-14

## 2025-04-14 ENCOUNTER — APPOINTMENT (OUTPATIENT)
Age: 65
End: 2025-04-14

## 2025-04-14 ENCOUNTER — OUTPATIENT (OUTPATIENT)
Dept: OUTPATIENT SERVICES | Facility: HOSPITAL | Age: 65
LOS: 1 days | End: 2025-04-14
Payer: MEDICARE

## 2025-04-14 DIAGNOSIS — Z00.00 ENCOUNTER FOR GENERAL ADULT MEDICAL EXAMINATION WITHOUT ABNORMAL FINDINGS: ICD-10-CM

## 2025-04-14 PROCEDURE — 80061 LIPID PANEL: CPT

## 2025-04-14 PROCEDURE — 84439 ASSAY OF FREE THYROXINE: CPT

## 2025-04-14 PROCEDURE — 82043 UR ALBUMIN QUANTITATIVE: CPT

## 2025-04-14 PROCEDURE — 87086 URINE CULTURE/COLONY COUNT: CPT

## 2025-04-14 PROCEDURE — 83036 HEMOGLOBIN GLYCOSYLATED A1C: CPT

## 2025-04-14 PROCEDURE — 84443 ASSAY THYROID STIM HORMONE: CPT

## 2025-04-14 PROCEDURE — 81001 URINALYSIS AUTO W/SCOPE: CPT

## 2025-04-14 PROCEDURE — 85025 COMPLETE CBC W/AUTO DIFF WBC: CPT

## 2025-04-14 PROCEDURE — 82306 VITAMIN D 25 HYDROXY: CPT

## 2025-04-14 PROCEDURE — 80053 COMPREHEN METABOLIC PANEL: CPT

## 2025-04-15 DIAGNOSIS — Z00.00 ENCOUNTER FOR GENERAL ADULT MEDICAL EXAMINATION WITHOUT ABNORMAL FINDINGS: ICD-10-CM

## 2025-04-30 ENCOUNTER — OUTPATIENT (OUTPATIENT)
Dept: OUTPATIENT SERVICES | Facility: HOSPITAL | Age: 65
LOS: 1 days | End: 2025-04-30
Payer: MEDICARE

## 2025-04-30 ENCOUNTER — APPOINTMENT (OUTPATIENT)
Dept: ENDOCRINOLOGY | Facility: CLINIC | Age: 65
End: 2025-04-30

## 2025-04-30 VITALS
HEART RATE: 71 BPM | WEIGHT: 139 LBS | DIASTOLIC BLOOD PRESSURE: 70 MMHG | SYSTOLIC BLOOD PRESSURE: 122 MMHG | BODY MASS INDEX: 22.44 KG/M2

## 2025-04-30 DIAGNOSIS — F17.200 NICOTINE DEPENDENCE, UNSPECIFIED, UNCOMPLICATED: ICD-10-CM

## 2025-04-30 DIAGNOSIS — Z00.00 ENCOUNTER FOR GENERAL ADULT MEDICAL EXAMINATION WITHOUT ABNORMAL FINDINGS: ICD-10-CM

## 2025-04-30 DIAGNOSIS — E11.29 TYPE 2 DIABETES MELLITUS WITH OTHER DIABETIC KIDNEY COMPLICATION: ICD-10-CM

## 2025-04-30 DIAGNOSIS — R80.9 TYPE 2 DIABETES MELLITUS WITH OTHER DIABETIC KIDNEY COMPLICATION: ICD-10-CM

## 2025-04-30 PROCEDURE — 99214 OFFICE O/P EST MOD 30 MIN: CPT

## 2025-05-08 DIAGNOSIS — E78.00 PURE HYPERCHOLESTEROLEMIA, UNSPECIFIED: ICD-10-CM

## 2025-05-08 DIAGNOSIS — E11.65 TYPE 2 DIABETES MELLITUS WITH HYPERGLYCEMIA: ICD-10-CM

## 2025-05-19 ENCOUNTER — APPOINTMENT (OUTPATIENT)
Dept: OPHTHALMOLOGY | Facility: CLINIC | Age: 65
End: 2025-05-19

## 2025-05-28 ENCOUNTER — APPOINTMENT (OUTPATIENT)
Age: 65
End: 2025-05-28
Payer: MEDICARE

## 2025-05-28 ENCOUNTER — LABORATORY RESULT (OUTPATIENT)
Age: 65
End: 2025-05-28

## 2025-05-28 ENCOUNTER — OUTPATIENT (OUTPATIENT)
Dept: OUTPATIENT SERVICES | Facility: HOSPITAL | Age: 65
LOS: 1 days | End: 2025-05-28
Payer: MEDICARE

## 2025-05-28 VITALS
DIASTOLIC BLOOD PRESSURE: 77 MMHG | WEIGHT: 139 LBS | OXYGEN SATURATION: 97 % | HEART RATE: 90 BPM | HEIGHT: 66 IN | TEMPERATURE: 97.9 F | BODY MASS INDEX: 22.34 KG/M2 | SYSTOLIC BLOOD PRESSURE: 198 MMHG

## 2025-05-28 DIAGNOSIS — N52.9 MALE ERECTILE DYSFUNCTION, UNSPECIFIED: ICD-10-CM

## 2025-05-28 DIAGNOSIS — Z00.00 ENCOUNTER FOR GENERAL ADULT MEDICAL EXAMINATION WITHOUT ABNORMAL FINDINGS: ICD-10-CM

## 2025-05-28 LAB
CHOLEST SERPL-MCNC: 236 MG/DL
HDLC SERPL-MCNC: 57 MG/DL
LDLC SERPL-MCNC: 157 MG/DL
NONHDLC SERPL-MCNC: 179 MG/DL
TESTOST SERPL-MCNC: 470 NG/DL
TRIGL SERPL-MCNC: 124 MG/DL

## 2025-05-28 PROCEDURE — G2211 COMPLEX E/M VISIT ADD ON: CPT

## 2025-05-28 PROCEDURE — 99204 OFFICE O/P NEW MOD 45 MIN: CPT

## 2025-05-28 RX ORDER — SILDENAFIL 100 MG/1
100 TABLET, FILM COATED ORAL
Qty: 30 | Refills: 5 | Status: ACTIVE | COMMUNITY
Start: 2025-05-28 | End: 1900-01-01

## 2025-06-11 ENCOUNTER — APPOINTMENT (OUTPATIENT)
Dept: ENDOCRINOLOGY | Facility: CLINIC | Age: 65
End: 2025-06-11

## 2025-06-11 ENCOUNTER — OUTPATIENT (OUTPATIENT)
Dept: OUTPATIENT SERVICES | Facility: HOSPITAL | Age: 65
LOS: 1 days | End: 2025-06-11
Payer: MEDICARE

## 2025-06-11 VITALS
SYSTOLIC BLOOD PRESSURE: 158 MMHG | DIASTOLIC BLOOD PRESSURE: 79 MMHG | BODY MASS INDEX: 23.14 KG/M2 | WEIGHT: 144 LBS | HEART RATE: 88 BPM | HEIGHT: 66 IN

## 2025-06-11 DIAGNOSIS — Z00.00 ENCOUNTER FOR GENERAL ADULT MEDICAL EXAMINATION WITHOUT ABNORMAL FINDINGS: ICD-10-CM

## 2025-06-11 PROCEDURE — 99214 OFFICE O/P EST MOD 30 MIN: CPT

## 2025-06-11 RX ORDER — BLOOD-GLUCOSE METER
W/DEVICE KIT MISCELLANEOUS
Qty: 1 | Refills: 0 | Status: ACTIVE | COMMUNITY
Start: 2025-06-11 | End: 1900-01-01

## 2025-06-11 RX ORDER — BLOOD SUGAR DIAGNOSTIC
STRIP MISCELLANEOUS 3 TIMES DAILY
Qty: 1 | Refills: 2 | Status: ACTIVE | COMMUNITY
Start: 2025-06-11 | End: 1900-01-01

## 2025-06-11 RX ORDER — LANCETS
EACH MISCELLANEOUS
Qty: 100 | Refills: 3 | Status: ACTIVE | COMMUNITY
Start: 2025-06-11 | End: 1900-01-01

## 2025-06-16 DIAGNOSIS — E11.65 TYPE 2 DIABETES MELLITUS WITH HYPERGLYCEMIA: ICD-10-CM

## 2025-06-16 DIAGNOSIS — E78.00 PURE HYPERCHOLESTEROLEMIA, UNSPECIFIED: ICD-10-CM

## 2025-06-18 ENCOUNTER — APPOINTMENT (OUTPATIENT)
Dept: OPHTHALMOLOGY | Facility: CLINIC | Age: 65
End: 2025-06-18
Payer: MEDICARE

## 2025-06-18 ENCOUNTER — OUTPATIENT (OUTPATIENT)
Dept: OUTPATIENT SERVICES | Facility: HOSPITAL | Age: 65
LOS: 1 days | End: 2025-06-18
Payer: MEDICARE

## 2025-06-18 DIAGNOSIS — H53.8 OTHER VISUAL DISTURBANCES: ICD-10-CM

## 2025-06-18 PROCEDURE — 92134 CPTRZ OPH DX IMG PST SGM RTA: CPT

## 2025-06-18 PROCEDURE — 92134 CPTRZ OPH DX IMG PST SGM RTA: CPT | Mod: 26

## 2025-06-18 PROCEDURE — 99213 OFFICE O/P EST LOW 20 MIN: CPT

## 2025-06-18 PROCEDURE — 99203 OFFICE O/P NEW LOW 30 MIN: CPT

## 2025-06-25 ENCOUNTER — OUTPATIENT (OUTPATIENT)
Dept: OUTPATIENT SERVICES | Facility: HOSPITAL | Age: 65
LOS: 1 days | End: 2025-06-25
Payer: MEDICARE

## 2025-06-25 ENCOUNTER — APPOINTMENT (OUTPATIENT)
Age: 65
End: 2025-06-25
Payer: MEDICARE

## 2025-06-25 VITALS
HEART RATE: 71 BPM | SYSTOLIC BLOOD PRESSURE: 133 MMHG | DIASTOLIC BLOOD PRESSURE: 63 MMHG | OXYGEN SATURATION: 96 % | TEMPERATURE: 97.7 F

## 2025-06-25 DIAGNOSIS — Z00.00 ENCOUNTER FOR GENERAL ADULT MEDICAL EXAMINATION WITHOUT ABNORMAL FINDINGS: ICD-10-CM

## 2025-06-25 PROCEDURE — G2211 COMPLEX E/M VISIT ADD ON: CPT

## 2025-06-25 PROCEDURE — 99214 OFFICE O/P EST MOD 30 MIN: CPT

## 2025-06-25 RX ORDER — PAPAVERINE HYDROCHLORIDE 30 MG/ML
30 INJECTION, SOLUTION INTRAVENOUS
Qty: 0.5 | Refills: 0 | Status: ACTIVE | COMMUNITY
Start: 2025-06-25 | End: 1900-01-01

## 2025-06-27 DIAGNOSIS — H35.81 RETINAL EDEMA: ICD-10-CM

## 2025-06-27 DIAGNOSIS — H25.10 AGE-RELATED NUCLEAR CATARACT, UNSPECIFIED EYE: ICD-10-CM

## 2025-06-27 DIAGNOSIS — E11.3299 TYPE 2 DIABETES MELLITUS WITH MILD NONPROLIFERATIVE DIABETIC RETINOPATHY WITHOUT MACULAR EDEMA, UNSPECIFIED EYE: ICD-10-CM

## 2025-06-27 DIAGNOSIS — H25.013 CORTICAL AGE-RELATED CATARACT, BILATERAL: ICD-10-CM

## 2025-07-08 ENCOUNTER — APPOINTMENT (OUTPATIENT)
Dept: OPHTHALMOLOGY | Facility: CLINIC | Age: 65
End: 2025-07-08

## 2025-07-21 ENCOUNTER — OUTPATIENT (OUTPATIENT)
Dept: OUTPATIENT SERVICES | Facility: HOSPITAL | Age: 65
LOS: 1 days | End: 2025-07-21
Payer: MEDICARE

## 2025-07-21 ENCOUNTER — APPOINTMENT (OUTPATIENT)
Dept: INTERNAL MEDICINE | Facility: CLINIC | Age: 65
End: 2025-07-21
Payer: MEDICARE

## 2025-07-21 VITALS
DIASTOLIC BLOOD PRESSURE: 66 MMHG | SYSTOLIC BLOOD PRESSURE: 169 MMHG | HEART RATE: 65 BPM | TEMPERATURE: 97.8 F | HEIGHT: 66 IN | WEIGHT: 147 LBS | OXYGEN SATURATION: 97 % | BODY MASS INDEX: 23.63 KG/M2

## 2025-07-21 DIAGNOSIS — R80.9 TYPE 2 DIABETES MELLITUS WITH OTHER DIABETIC KIDNEY COMPLICATION: ICD-10-CM

## 2025-07-21 DIAGNOSIS — E78.00 PURE HYPERCHOLESTEROLEMIA, UNSPECIFIED: ICD-10-CM

## 2025-07-21 DIAGNOSIS — M25.511 PAIN IN RIGHT SHOULDER: ICD-10-CM

## 2025-07-21 DIAGNOSIS — E11.29 TYPE 2 DIABETES MELLITUS WITH OTHER DIABETIC KIDNEY COMPLICATION: ICD-10-CM

## 2025-07-21 DIAGNOSIS — R91.1 SOLITARY PULMONARY NODULE: ICD-10-CM

## 2025-07-21 DIAGNOSIS — Z72.0 TOBACCO USE: ICD-10-CM

## 2025-07-21 DIAGNOSIS — Z86.39 PERSONAL HISTORY OF OTHER ENDOCRINE, NUTRITIONAL AND METABOLIC DISEASE: ICD-10-CM

## 2025-07-21 DIAGNOSIS — I25.10 ATHEROSCLEROTIC HEART DISEASE OF NATIVE CORONARY ARTERY W/OUT ANGINA PECTORIS: ICD-10-CM

## 2025-07-21 DIAGNOSIS — M62.838 OTHER MUSCLE SPASM: ICD-10-CM

## 2025-07-21 DIAGNOSIS — Z00.00 ENCOUNTER FOR GENERAL ADULT MEDICAL EXAMINATION WITHOUT ABNORMAL FINDINGS: ICD-10-CM

## 2025-07-21 DIAGNOSIS — I10 ESSENTIAL (PRIMARY) HYPERTENSION: ICD-10-CM

## 2025-07-21 DIAGNOSIS — Z86.718 PERSONAL HISTORY OF OTHER VENOUS THROMBOSIS AND EMBOLISM: ICD-10-CM

## 2025-07-21 DIAGNOSIS — N52.9 MALE ERECTILE DYSFUNCTION, UNSPECIFIED: ICD-10-CM

## 2025-07-21 PROCEDURE — 99214 OFFICE O/P EST MOD 30 MIN: CPT

## 2025-07-21 PROCEDURE — 99204 OFFICE O/P NEW MOD 45 MIN: CPT | Mod: GC

## 2025-07-22 DIAGNOSIS — M25.511 PAIN IN RIGHT SHOULDER: ICD-10-CM

## 2025-07-22 DIAGNOSIS — E11.29 TYPE 2 DIABETES MELLITUS WITH OTHER DIABETIC KIDNEY COMPLICATION: ICD-10-CM

## 2025-07-22 DIAGNOSIS — I25.10 ATHEROSCLEROTIC HEART DISEASE OF NATIVE CORONARY ARTERY WITHOUT ANGINA PECTORIS: ICD-10-CM

## 2025-07-22 DIAGNOSIS — R91.1 SOLITARY PULMONARY NODULE: ICD-10-CM

## 2025-07-22 DIAGNOSIS — M62.838 OTHER MUSCLE SPASM: ICD-10-CM

## 2025-07-22 DIAGNOSIS — Z86.39 PERSONAL HISTORY OF OTHER ENDOCRINE, NUTRITIONAL AND METABOLIC DISEASE: ICD-10-CM

## 2025-07-22 DIAGNOSIS — I10 ESSENTIAL (PRIMARY) HYPERTENSION: ICD-10-CM

## 2025-07-22 DIAGNOSIS — N52.9 MALE ERECTILE DYSFUNCTION, UNSPECIFIED: ICD-10-CM

## 2025-07-22 DIAGNOSIS — Z72.0 TOBACCO USE: ICD-10-CM

## 2025-07-22 DIAGNOSIS — E78.00 PURE HYPERCHOLESTEROLEMIA, UNSPECIFIED: ICD-10-CM

## 2025-07-22 DIAGNOSIS — Z86.718 PERSONAL HISTORY OF OTHER VENOUS THROMBOSIS AND EMBOLISM: ICD-10-CM

## 2025-08-23 ENCOUNTER — OUTPATIENT (OUTPATIENT)
Dept: OUTPATIENT SERVICES | Facility: HOSPITAL | Age: 65
LOS: 1 days | End: 2025-08-23
Payer: MEDICARE

## 2025-08-23 DIAGNOSIS — Z00.8 ENCOUNTER FOR OTHER GENERAL EXAMINATION: ICD-10-CM

## 2025-08-23 DIAGNOSIS — R91.1 SOLITARY PULMONARY NODULE: ICD-10-CM

## 2025-08-23 DIAGNOSIS — M25.511 PAIN IN RIGHT SHOULDER: ICD-10-CM

## 2025-08-23 PROCEDURE — 71250 CT THORAX DX C-: CPT | Mod: 26

## 2025-08-23 PROCEDURE — 71250 CT THORAX DX C-: CPT

## 2025-08-23 PROCEDURE — 73030 X-RAY EXAM OF SHOULDER: CPT | Mod: 26,RT

## 2025-08-23 PROCEDURE — 73030 X-RAY EXAM OF SHOULDER: CPT | Mod: RT

## 2025-08-24 DIAGNOSIS — R91.1 SOLITARY PULMONARY NODULE: ICD-10-CM

## 2025-08-24 DIAGNOSIS — M25.511 PAIN IN RIGHT SHOULDER: ICD-10-CM

## 2025-09-02 ENCOUNTER — EMERGENCY (EMERGENCY)
Facility: HOSPITAL | Age: 65
LOS: 0 days | Discharge: HOME | End: 2025-09-03
Attending: EMERGENCY MEDICINE
Payer: MEDICARE

## 2025-09-02 VITALS
HEART RATE: 79 BPM | RESPIRATION RATE: 17 BRPM | WEIGHT: 138.89 LBS | HEIGHT: 68 IN | TEMPERATURE: 98 F | OXYGEN SATURATION: 98 % | SYSTOLIC BLOOD PRESSURE: 175 MMHG | DIASTOLIC BLOOD PRESSURE: 82 MMHG

## 2025-09-02 DIAGNOSIS — M25.551 PAIN IN RIGHT HIP: ICD-10-CM

## 2025-09-02 DIAGNOSIS — M25.511 PAIN IN RIGHT SHOULDER: ICD-10-CM

## 2025-09-02 DIAGNOSIS — M54.50 LOW BACK PAIN, UNSPECIFIED: ICD-10-CM

## 2025-09-02 DIAGNOSIS — M25.512 PAIN IN LEFT SHOULDER: ICD-10-CM

## 2025-09-02 DIAGNOSIS — Z86.718 PERSONAL HISTORY OF OTHER VENOUS THROMBOSIS AND EMBOLISM: ICD-10-CM

## 2025-09-02 DIAGNOSIS — Z79.01 LONG TERM (CURRENT) USE OF ANTICOAGULANTS: ICD-10-CM

## 2025-09-02 DIAGNOSIS — F17.200 NICOTINE DEPENDENCE, UNSPECIFIED, UNCOMPLICATED: ICD-10-CM

## 2025-09-02 DIAGNOSIS — M54.2 CERVICALGIA: ICD-10-CM

## 2025-09-02 DIAGNOSIS — M25.552 PAIN IN LEFT HIP: ICD-10-CM

## 2025-09-02 PROCEDURE — 96372 THER/PROPH/DIAG INJ SC/IM: CPT

## 2025-09-02 PROCEDURE — 99283 EMERGENCY DEPT VISIT LOW MDM: CPT | Mod: 25

## 2025-09-02 PROCEDURE — 99284 EMERGENCY DEPT VISIT MOD MDM: CPT

## 2025-09-03 RX ORDER — KETOROLAC TROMETHAMINE 30 MG/ML
30 INJECTION, SOLUTION INTRAMUSCULAR; INTRAVENOUS ONCE
Refills: 0 | Status: DISCONTINUED | OUTPATIENT
Start: 2025-09-03 | End: 2025-09-03

## 2025-09-03 RX ORDER — METHOCARBAMOL 500 MG/1
1000 TABLET, FILM COATED ORAL ONCE
Refills: 0 | Status: COMPLETED | OUTPATIENT
Start: 2025-09-03 | End: 2025-09-03

## 2025-09-03 RX ADMIN — METHOCARBAMOL 1000 MILLIGRAM(S): 500 TABLET, FILM COATED ORAL at 00:28

## 2025-09-03 RX ADMIN — KETOROLAC TROMETHAMINE 30 MILLIGRAM(S): 30 INJECTION, SOLUTION INTRAMUSCULAR; INTRAVENOUS at 00:28

## 2025-09-04 ENCOUNTER — APPOINTMENT (OUTPATIENT)
Dept: UROLOGY | Facility: CLINIC | Age: 65
End: 2025-09-04

## 2025-09-08 ENCOUNTER — RESULT REVIEW (OUTPATIENT)
Age: 65
End: 2025-09-08

## 2025-09-11 ENCOUNTER — APPOINTMENT (OUTPATIENT)
Dept: INTERNAL MEDICINE | Facility: CLINIC | Age: 65
End: 2025-09-11
Payer: MEDICARE

## 2025-09-11 VITALS
TEMPERATURE: 97.2 F | HEART RATE: 75 BPM | HEIGHT: 66 IN | WEIGHT: 140 LBS | OXYGEN SATURATION: 98 % | SYSTOLIC BLOOD PRESSURE: 134 MMHG | DIASTOLIC BLOOD PRESSURE: 65 MMHG | BODY MASS INDEX: 22.5 KG/M2

## 2025-09-11 DIAGNOSIS — E11.29 TYPE 2 DIABETES MELLITUS WITH OTHER DIABETIC KIDNEY COMPLICATION: ICD-10-CM

## 2025-09-11 DIAGNOSIS — N52.9 MALE ERECTILE DYSFUNCTION, UNSPECIFIED: ICD-10-CM

## 2025-09-11 DIAGNOSIS — E78.00 PURE HYPERCHOLESTEROLEMIA, UNSPECIFIED: ICD-10-CM

## 2025-09-11 DIAGNOSIS — I10 ESSENTIAL (PRIMARY) HYPERTENSION: ICD-10-CM

## 2025-09-11 DIAGNOSIS — G47.10 HYPERSOMNIA, UNSPECIFIED: ICD-10-CM

## 2025-09-11 DIAGNOSIS — I82.409 ACUTE EMBOLISM AND THROMBOSIS OF UNSPECIFIED DEEP VEINS OF UNSPECIFIED LOWER EXTREMITY: ICD-10-CM

## 2025-09-11 DIAGNOSIS — Z72.0 TOBACCO USE: ICD-10-CM

## 2025-09-11 DIAGNOSIS — M25.511 PAIN IN RIGHT SHOULDER: ICD-10-CM

## 2025-09-11 DIAGNOSIS — R80.9 TYPE 2 DIABETES MELLITUS WITH OTHER DIABETIC KIDNEY COMPLICATION: ICD-10-CM

## 2025-09-11 DIAGNOSIS — I25.10 ATHEROSCLEROTIC HEART DISEASE OF NATIVE CORONARY ARTERY W/OUT ANGINA PECTORIS: ICD-10-CM

## 2025-09-11 DIAGNOSIS — Z86.39 PERSONAL HISTORY OF OTHER ENDOCRINE, NUTRITIONAL AND METABOLIC DISEASE: ICD-10-CM

## 2025-09-11 PROCEDURE — 99214 OFFICE O/P EST MOD 30 MIN: CPT | Mod: GC

## 2025-09-11 RX ORDER — ATORVASTATIN CALCIUM 80 MG/1
80 TABLET, FILM COATED ORAL
Qty: 90 | Refills: 3 | Status: ACTIVE | COMMUNITY
Start: 2025-09-11 | End: 1900-01-01

## 2025-09-11 RX ORDER — APIXABAN 5 MG/1
5 TABLET, FILM COATED ORAL
Qty: 1 | Refills: 2 | Status: ACTIVE | COMMUNITY
Start: 2025-09-10 | End: 1900-01-01

## 2025-09-13 ENCOUNTER — NON-APPOINTMENT (OUTPATIENT)
Age: 65
End: 2025-09-13

## 2025-09-19 ENCOUNTER — APPOINTMENT (OUTPATIENT)
Dept: UROLOGY | Facility: CLINIC | Age: 65
End: 2025-09-19